# Patient Record
Sex: FEMALE | Race: WHITE | Employment: OTHER | URBAN - METROPOLITAN AREA
[De-identification: names, ages, dates, MRNs, and addresses within clinical notes are randomized per-mention and may not be internally consistent; named-entity substitution may affect disease eponyms.]

---

## 2019-01-01 ENCOUNTER — OFFICE VISIT (OUTPATIENT)
Dept: ORTHOPEDIC SURGERY | Age: 69
End: 2019-01-01
Payer: MEDICARE

## 2019-01-01 ENCOUNTER — APPOINTMENT (OUTPATIENT)
Dept: GENERAL RADIOLOGY | Age: 69
End: 2019-01-01
Payer: MEDICARE

## 2019-01-01 ENCOUNTER — HOSPITAL ENCOUNTER (OUTPATIENT)
Dept: GENERAL RADIOLOGY | Age: 69
Discharge: HOME | End: 2019-10-13
Payer: MEDICARE

## 2019-01-01 ENCOUNTER — HOSPITAL ENCOUNTER (EMERGENCY)
Age: 69
Discharge: HOME | End: 2019-10-02
Payer: MEDICARE

## 2019-01-01 ENCOUNTER — HOSPITAL ENCOUNTER (EMERGENCY)
Age: 69
Discharge: HOME | End: 2019-09-29
Attending: EMERGENCY MEDICINE
Payer: MEDICARE

## 2019-01-01 ENCOUNTER — HOSPITAL ENCOUNTER (OUTPATIENT)
Dept: GENERAL RADIOLOGY | Age: 69
Discharge: HOME | End: 2019-11-27
Payer: MEDICARE

## 2019-01-01 ENCOUNTER — TELEPHONE (OUTPATIENT)
Dept: ORTHOPEDIC SURGERY | Age: 69
End: 2019-01-01

## 2019-01-01 ENCOUNTER — ANESTHESIA (OUTPATIENT)
Dept: OPERATING ROOM | Age: 69
End: 2019-01-01
Payer: MEDICARE

## 2019-01-01 ENCOUNTER — ANESTHESIA EVENT (OUTPATIENT)
Dept: OPERATING ROOM | Age: 69
End: 2019-01-01
Payer: MEDICARE

## 2019-01-01 ENCOUNTER — HOSPITAL ENCOUNTER (OUTPATIENT)
Dept: GENERAL RADIOLOGY | Age: 69
Discharge: HOME | End: 2019-10-30
Payer: MEDICARE

## 2019-01-01 VITALS
HEART RATE: 81 BPM | TEMPERATURE: 97 F | BODY MASS INDEX: 21.17 KG/M2 | DIASTOLIC BLOOD PRESSURE: 72 MMHG | RESPIRATION RATE: 18 BRPM | WEIGHT: 124 LBS | SYSTOLIC BLOOD PRESSURE: 137 MMHG | HEIGHT: 64 IN

## 2019-01-01 VITALS
HEART RATE: 81 BPM | SYSTOLIC BLOOD PRESSURE: 117 MMHG | DIASTOLIC BLOOD PRESSURE: 64 MMHG | WEIGHT: 124 LBS | HEIGHT: 64 IN | BODY MASS INDEX: 21.17 KG/M2

## 2019-01-01 VITALS
OXYGEN SATURATION: 98 % | RESPIRATION RATE: 15 BRPM | TEMPERATURE: 97 F | HEIGHT: 64 IN | BODY MASS INDEX: 21.17 KG/M2 | HEART RATE: 89 BPM | DIASTOLIC BLOOD PRESSURE: 79 MMHG | WEIGHT: 124 LBS | SYSTOLIC BLOOD PRESSURE: 126 MMHG

## 2019-01-01 VITALS — DIASTOLIC BLOOD PRESSURE: 65 MMHG | SYSTOLIC BLOOD PRESSURE: 115 MMHG | TEMPERATURE: 97 F | HEART RATE: 85 BPM

## 2019-01-01 VITALS
RESPIRATION RATE: 14 BRPM | HEART RATE: 90 BPM | WEIGHT: 124 LBS | BODY MASS INDEX: 21.17 KG/M2 | OXYGEN SATURATION: 96 % | HEIGHT: 64 IN | SYSTOLIC BLOOD PRESSURE: 165 MMHG | TEMPERATURE: 98 F | DIASTOLIC BLOOD PRESSURE: 80 MMHG

## 2019-01-01 VITALS — DIASTOLIC BLOOD PRESSURE: 51 MMHG | TEMPERATURE: 95.4 F | OXYGEN SATURATION: 100 % | SYSTOLIC BLOOD PRESSURE: 105 MMHG

## 2019-01-01 DIAGNOSIS — Z46.89 ENCOUNTER FOR CAST CHECK: Primary | ICD-10-CM

## 2019-01-01 DIAGNOSIS — S52.502A CLOSED FRACTURE OF DISTAL ENDS OF LEFT RADIUS AND ULNA, INITIAL ENCOUNTER: ICD-10-CM

## 2019-01-01 DIAGNOSIS — R52 PAIN: ICD-10-CM

## 2019-01-01 DIAGNOSIS — S52.532D CLOSED COLLES' FRACTURE OF LEFT RADIUS WITH ROUTINE HEALING, SUBSEQUENT ENCOUNTER: Primary | ICD-10-CM

## 2019-01-01 DIAGNOSIS — S52.532A CLOSED COLLES' FRACTURE OF LEFT RADIUS, INITIAL ENCOUNTER: Primary | ICD-10-CM

## 2019-01-01 DIAGNOSIS — S52.532D CLOSED COLLES' FRACTURE OF LEFT RADIUS WITH ROUTINE HEALING, SUBSEQUENT ENCOUNTER: ICD-10-CM

## 2019-01-01 DIAGNOSIS — R44.9 SENSORY DEFICIT, LEFT: ICD-10-CM

## 2019-01-01 DIAGNOSIS — S52.602A CLOSED FRACTURE OF DISTAL ENDS OF LEFT RADIUS AND ULNA, INITIAL ENCOUNTER: ICD-10-CM

## 2019-01-01 LAB
ABO/RH: NORMAL
ABO/RH: NORMAL
ANTIBODY SCREEN: NORMAL

## 2019-01-01 PROCEDURE — 73130 X-RAY EXAM OF HAND: CPT

## 2019-01-01 PROCEDURE — 99024 POSTOP FOLLOW-UP VISIT: CPT | Performed by: ORTHOPAEDIC SURGERY

## 2019-01-01 PROCEDURE — 2580000003 HC RX 258: Performed by: NURSE ANESTHETIST, CERTIFIED REGISTERED

## 2019-01-01 PROCEDURE — C1713 ANCHOR/SCREW BN/BN,TIS/BN: HCPCS | Performed by: ORTHOPAEDIC SURGERY

## 2019-01-01 PROCEDURE — 6370000000 HC RX 637 (ALT 250 FOR IP): Performed by: EMERGENCY MEDICINE

## 2019-01-01 PROCEDURE — 3600000014 HC SURGERY LEVEL 4 ADDTL 15MIN: Performed by: ORTHOPAEDIC SURGERY

## 2019-01-01 PROCEDURE — 99213 OFFICE O/P EST LOW 20 MIN: CPT | Performed by: PHYSICIAN ASSISTANT

## 2019-01-01 PROCEDURE — 6370000000 HC RX 637 (ALT 250 FOR IP): Performed by: ORTHOPAEDIC SURGERY

## 2019-01-01 PROCEDURE — 25530 CLTX ULNAR SHFT FX W/O MNPJ: CPT | Performed by: ORTHOPAEDIC SURGERY

## 2019-01-01 PROCEDURE — 73110 X-RAY EXAM OF WRIST: CPT

## 2019-01-01 PROCEDURE — 2500000003 HC RX 250 WO HCPCS: Performed by: NURSE ANESTHETIST, CERTIFIED REGISTERED

## 2019-01-01 PROCEDURE — 99024 POSTOP FOLLOW-UP VISIT: CPT | Performed by: PHYSICIAN ASSISTANT

## 2019-01-01 PROCEDURE — 36415 COLL VENOUS BLD VENIPUNCTURE: CPT

## 2019-01-01 PROCEDURE — 99203 OFFICE O/P NEW LOW 30 MIN: CPT | Performed by: ORTHOPAEDIC SURGERY

## 2019-01-01 PROCEDURE — 7100000010 HC PHASE II RECOVERY - FIRST 15 MIN: Performed by: ORTHOPAEDIC SURGERY

## 2019-01-01 PROCEDURE — 7100000011 HC PHASE II RECOVERY - ADDTL 15 MIN: Performed by: ORTHOPAEDIC SURGERY

## 2019-01-01 PROCEDURE — 99212 OFFICE O/P EST SF 10 MIN: CPT

## 2019-01-01 PROCEDURE — 2720000010 HC SURG SUPPLY STERILE: Performed by: ORTHOPAEDIC SURGERY

## 2019-01-01 PROCEDURE — 2500000003 HC RX 250 WO HCPCS: Performed by: ORTHOPAEDIC SURGERY

## 2019-01-01 PROCEDURE — 73100 X-RAY EXAM OF WRIST: CPT

## 2019-01-01 PROCEDURE — 99284 EMERGENCY DEPT VISIT MOD MDM: CPT

## 2019-01-01 PROCEDURE — 99282 EMERGENCY DEPT VISIT SF MDM: CPT

## 2019-01-01 PROCEDURE — 3700000001 HC ADD 15 MINUTES (ANESTHESIA): Performed by: ORTHOPAEDIC SURGERY

## 2019-01-01 PROCEDURE — 25608 OPTX DST RD XART FX/EPI SEP2: CPT | Performed by: ORTHOPAEDIC SURGERY

## 2019-01-01 PROCEDURE — 3700000000 HC ANESTHESIA ATTENDED CARE: Performed by: ORTHOPAEDIC SURGERY

## 2019-01-01 PROCEDURE — 3600000004 HC SURGERY LEVEL 4 BASE: Performed by: ORTHOPAEDIC SURGERY

## 2019-01-01 PROCEDURE — 2709999900 HC NON-CHARGEABLE SUPPLY: Performed by: ORTHOPAEDIC SURGERY

## 2019-01-01 PROCEDURE — 7100000001 HC PACU RECOVERY - ADDTL 15 MIN: Performed by: ORTHOPAEDIC SURGERY

## 2019-01-01 PROCEDURE — 99285 EMERGENCY DEPT VISIT HI MDM: CPT | Performed by: ORTHOPAEDIC SURGERY

## 2019-01-01 PROCEDURE — 3209999900 FLUORO FOR SURGICAL PROCEDURES

## 2019-01-01 PROCEDURE — 86850 RBC ANTIBODY SCREEN: CPT

## 2019-01-01 PROCEDURE — 6360000002 HC RX W HCPCS: Performed by: NURSE ANESTHETIST, CERTIFIED REGISTERED

## 2019-01-01 PROCEDURE — 86900 BLOOD TYPING SEROLOGIC ABO: CPT

## 2019-01-01 PROCEDURE — 7100000000 HC PACU RECOVERY - FIRST 15 MIN: Performed by: ORTHOPAEDIC SURGERY

## 2019-01-01 PROCEDURE — 2500000003 HC RX 250 WO HCPCS: Performed by: EMERGENCY MEDICINE

## 2019-01-01 PROCEDURE — 94761 N-INVAS EAR/PLS OXIMETRY MLT: CPT

## 2019-01-01 PROCEDURE — 86901 BLOOD TYPING SEROLOGIC RH(D): CPT

## 2019-01-01 PROCEDURE — 64721 CARPAL TUNNEL SURGERY: CPT | Performed by: ORTHOPAEDIC SURGERY

## 2019-01-01 DEVICE — SCREW BNE L18MM DIA2.4MM DST RAD VOLAR S STL ST VAR ANG LOK: Type: IMPLANTABLE DEVICE | Site: RADIUS | Status: FUNCTIONAL

## 2019-01-01 DEVICE — SCREW BNE L14MM DIA2.7MM CORT S STL ST T8 STARDRV RECESS: Type: IMPLANTABLE DEVICE | Site: RADIUS | Status: FUNCTIONAL

## 2019-01-01 DEVICE — SCREW BNE L16MM DIA2.4MM DST RAD VOLAR S STL ST VAR ANG LOK: Type: IMPLANTABLE DEVICE | Site: RADIUS | Status: FUNCTIONAL

## 2019-01-01 DEVICE — SCREW BNE L12MM DIA2.4MM DST RAD VOLAR S STL ST VAR ANG LOK: Type: IMPLANTABLE DEVICE | Site: RADIUS | Status: FUNCTIONAL

## 2019-01-01 DEVICE — PLATE BNE W19.5XL51MM NAR 6X3 H ST L DST RAD VOLAR S STL: Type: IMPLANTABLE DEVICE | Site: RADIUS | Status: FUNCTIONAL

## 2019-01-01 DEVICE — SCREW BNE L12MM DIA2.7MM CORT S STL ST T8 STARDRV RECESS: Type: IMPLANTABLE DEVICE | Site: RADIUS | Status: FUNCTIONAL

## 2019-01-01 RX ORDER — PROMETHAZINE HYDROCHLORIDE 25 MG/ML
6.25 INJECTION, SOLUTION INTRAMUSCULAR; INTRAVENOUS
Status: DISCONTINUED | OUTPATIENT
Start: 2019-01-01 | End: 2019-01-01 | Stop reason: HOSPADM

## 2019-01-01 RX ORDER — ONDANSETRON 2 MG/ML
INJECTION INTRAMUSCULAR; INTRAVENOUS PRN
Status: DISCONTINUED | OUTPATIENT
Start: 2019-01-01 | End: 2019-01-01 | Stop reason: SDUPTHER

## 2019-01-01 RX ORDER — MORPHINE SULFATE 2 MG/ML
2 INJECTION, SOLUTION INTRAMUSCULAR; INTRAVENOUS EVERY 5 MIN PRN
Status: DISCONTINUED | OUTPATIENT
Start: 2019-01-01 | End: 2019-01-01 | Stop reason: HOSPADM

## 2019-01-01 RX ORDER — LIDOCAINE HYDROCHLORIDE 20 MG/ML
INJECTION, SOLUTION INTRAVENOUS PRN
Status: DISCONTINUED | OUTPATIENT
Start: 2019-01-01 | End: 2019-01-01 | Stop reason: SDUPTHER

## 2019-01-01 RX ORDER — CEFAZOLIN SODIUM 2 G/50ML
2 SOLUTION INTRAVENOUS
Status: CANCELLED | OUTPATIENT
Start: 2019-01-01 | End: 2019-01-01

## 2019-01-01 RX ORDER — AZITHROMYCIN 250 MG/1
250 TABLET, FILM COATED ORAL DAILY
COMMUNITY
End: 2019-01-01 | Stop reason: ALTCHOICE

## 2019-01-01 RX ORDER — CEFAZOLIN SODIUM 1 G/3ML
INJECTION, POWDER, FOR SOLUTION INTRAMUSCULAR; INTRAVENOUS PRN
Status: DISCONTINUED | OUTPATIENT
Start: 2019-01-01 | End: 2019-01-01 | Stop reason: SDUPTHER

## 2019-01-01 RX ORDER — HYDROCODONE BITARTRATE AND ACETAMINOPHEN 5; 325 MG/1; MG/1
1 TABLET ORAL EVERY 6 HOURS PRN
Qty: 28 TABLET | Refills: 0 | Status: SHIPPED | OUTPATIENT
Start: 2019-01-01 | End: 2019-01-01

## 2019-01-01 RX ORDER — SODIUM CHLORIDE 9 MG/ML
INJECTION, SOLUTION INTRAVENOUS CONTINUOUS PRN
Status: DISCONTINUED | OUTPATIENT
Start: 2019-01-01 | End: 2019-01-01 | Stop reason: SDUPTHER

## 2019-01-01 RX ORDER — HYDROCODONE BITARTRATE AND ACETAMINOPHEN 5; 325 MG/1; MG/1
1 TABLET ORAL ONCE
Status: COMPLETED | OUTPATIENT
Start: 2019-01-01 | End: 2019-01-01

## 2019-01-01 RX ORDER — DONEPEZIL HYDROCHLORIDE 5 MG/1
5 TABLET, FILM COATED ORAL NIGHTLY
Status: ON HOLD | COMMUNITY
End: 2020-01-01 | Stop reason: HOSPADM

## 2019-01-01 RX ORDER — MEPERIDINE HYDROCHLORIDE 50 MG/ML
12.5 INJECTION INTRAMUSCULAR; INTRAVENOUS; SUBCUTANEOUS EVERY 5 MIN PRN
Status: DISCONTINUED | OUTPATIENT
Start: 2019-01-01 | End: 2019-01-01 | Stop reason: HOSPADM

## 2019-01-01 RX ORDER — LIDOCAINE HYDROCHLORIDE 20 MG/ML
5 INJECTION, SOLUTION INFILTRATION; PERINEURAL ONCE
Status: COMPLETED | OUTPATIENT
Start: 2019-01-01 | End: 2019-01-01

## 2019-01-01 RX ORDER — NEOSTIGMINE METHYLSULFATE 1 MG/ML
INJECTION, SOLUTION INTRAVENOUS PRN
Status: DISCONTINUED | OUTPATIENT
Start: 2019-01-01 | End: 2019-01-01 | Stop reason: SDUPTHER

## 2019-01-01 RX ORDER — HYDROCODONE BITARTRATE AND ACETAMINOPHEN 5; 325 MG/1; MG/1
1 TABLET ORAL ONCE
Status: DISCONTINUED | OUTPATIENT
Start: 2019-01-01 | End: 2019-01-01 | Stop reason: HOSPADM

## 2019-01-01 RX ORDER — GLYCOPYRROLATE 1 MG/5 ML
SYRINGE (ML) INTRAVENOUS PRN
Status: DISCONTINUED | OUTPATIENT
Start: 2019-01-01 | End: 2019-01-01 | Stop reason: SDUPTHER

## 2019-01-01 RX ORDER — ROCURONIUM BROMIDE 10 MG/ML
INJECTION, SOLUTION INTRAVENOUS PRN
Status: DISCONTINUED | OUTPATIENT
Start: 2019-01-01 | End: 2019-01-01 | Stop reason: SDUPTHER

## 2019-01-01 RX ORDER — IBUPROFEN 600 MG/1
600 TABLET ORAL ONCE
Status: COMPLETED | OUTPATIENT
Start: 2019-01-01 | End: 2019-01-01

## 2019-01-01 RX ORDER — BUPIVACAINE HYDROCHLORIDE 5 MG/ML
INJECTION, SOLUTION EPIDURAL; INTRACAUDAL PRN
Status: DISCONTINUED | OUTPATIENT
Start: 2019-01-01 | End: 2019-01-01 | Stop reason: ALTCHOICE

## 2019-01-01 RX ORDER — FENTANYL CITRATE 50 UG/ML
INJECTION, SOLUTION INTRAMUSCULAR; INTRAVENOUS PRN
Status: DISCONTINUED | OUTPATIENT
Start: 2019-01-01 | End: 2019-01-01 | Stop reason: SDUPTHER

## 2019-01-01 RX ORDER — VITAMIN B COMPLEX
TABLET ORAL
COMMUNITY
End: 2020-01-01

## 2019-01-01 RX ORDER — PROPOFOL 10 MG/ML
INJECTION, EMULSION INTRAVENOUS PRN
Status: DISCONTINUED | OUTPATIENT
Start: 2019-01-01 | End: 2019-01-01 | Stop reason: SDUPTHER

## 2019-01-01 RX ORDER — ONDANSETRON 2 MG/ML
4 INJECTION INTRAMUSCULAR; INTRAVENOUS
Status: DISCONTINUED | OUTPATIENT
Start: 2019-01-01 | End: 2019-01-01 | Stop reason: HOSPADM

## 2019-01-01 RX ORDER — DIAPER,BRIEF,INFANT-TODD,DISP
EACH MISCELLANEOUS PRN
Status: DISCONTINUED | OUTPATIENT
Start: 2019-01-01 | End: 2019-01-01 | Stop reason: ALTCHOICE

## 2019-01-01 RX ORDER — EPHEDRINE SULFATE/0.9% NACL/PF 50 MG/5 ML
SYRINGE (ML) INTRAVENOUS PRN
Status: DISCONTINUED | OUTPATIENT
Start: 2019-01-01 | End: 2019-01-01 | Stop reason: SDUPTHER

## 2019-01-01 RX ORDER — MIDAZOLAM HYDROCHLORIDE 1 MG/ML
INJECTION INTRAMUSCULAR; INTRAVENOUS PRN
Status: DISCONTINUED | OUTPATIENT
Start: 2019-01-01 | End: 2019-01-01 | Stop reason: SDUPTHER

## 2019-01-01 RX ADMIN — HYDROCODONE BITARTRATE AND ACETAMINOPHEN 1 TABLET: 5; 325 TABLET ORAL at 03:07

## 2019-01-01 RX ADMIN — CEFAZOLIN 2000 MG: 1 INJECTION, POWDER, FOR SOLUTION INTRAMUSCULAR; INTRAVENOUS at 06:36

## 2019-01-01 RX ADMIN — PROPOFOL 180 MG: 10 INJECTION, EMULSION INTRAVENOUS at 06:25

## 2019-01-01 RX ADMIN — PHENYLEPHRINE HYDROCHLORIDE 50 MCG: 10 INJECTION INTRAVENOUS at 07:13

## 2019-01-01 RX ADMIN — Medication 5 MG: at 07:21

## 2019-01-01 RX ADMIN — ONDANSETRON HYDROCHLORIDE 4 MG: 2 INJECTION, SOLUTION INTRAMUSCULAR; INTRAVENOUS at 07:29

## 2019-01-01 RX ADMIN — PROPOFOL 20 MG: 10 INJECTION, EMULSION INTRAVENOUS at 07:45

## 2019-01-01 RX ADMIN — Medication 0.6 MG: at 07:40

## 2019-01-01 RX ADMIN — PHENYLEPHRINE HYDROCHLORIDE 100 MCG: 10 INJECTION INTRAVENOUS at 06:38

## 2019-01-01 RX ADMIN — PHENYLEPHRINE HYDROCHLORIDE 100 MCG: 10 INJECTION INTRAVENOUS at 06:31

## 2019-01-01 RX ADMIN — FENTANYL CITRATE 100 MCG: 50 INJECTION, SOLUTION INTRAMUSCULAR; INTRAVENOUS at 06:25

## 2019-01-01 RX ADMIN — SODIUM CHLORIDE: 9 INJECTION, SOLUTION INTRAVENOUS at 06:19

## 2019-01-01 RX ADMIN — Medication 3 MG: at 07:40

## 2019-01-01 RX ADMIN — FENTANYL CITRATE 50 MCG: 50 INJECTION, SOLUTION INTRAMUSCULAR; INTRAVENOUS at 07:45

## 2019-01-01 RX ADMIN — ROCURONIUM BROMIDE 35 MG: 10 INJECTION, SOLUTION INTRAVENOUS at 06:25

## 2019-01-01 RX ADMIN — PHENYLEPHRINE HYDROCHLORIDE 100 MCG: 10 INJECTION INTRAVENOUS at 06:43

## 2019-01-01 RX ADMIN — LIDOCAINE HYDROCHLORIDE 5 ML: 20 INJECTION, SOLUTION INFILTRATION; PERINEURAL at 02:52

## 2019-01-01 RX ADMIN — PHENYLEPHRINE HYDROCHLORIDE 100 MCG: 10 INJECTION INTRAVENOUS at 06:26

## 2019-01-01 RX ADMIN — PHENYLEPHRINE HYDROCHLORIDE 50 MCG: 10 INJECTION INTRAVENOUS at 07:10

## 2019-01-01 RX ADMIN — SODIUM CHLORIDE: 9 INJECTION, SOLUTION INTRAVENOUS at 07:22

## 2019-01-01 RX ADMIN — MIDAZOLAM HYDROCHLORIDE 2 MG: 1 INJECTION, SOLUTION INTRAMUSCULAR; INTRAVENOUS at 06:19

## 2019-01-01 RX ADMIN — Medication 5 MG: at 07:35

## 2019-01-01 RX ADMIN — IBUPROFEN 600 MG: 600 TABLET, FILM COATED ORAL at 02:52

## 2019-01-01 RX ADMIN — LIDOCAINE HYDROCHLORIDE 40 MG: 20 INJECTION, SOLUTION INTRAVENOUS at 06:25

## 2019-01-01 RX ADMIN — PHENYLEPHRINE HYDROCHLORIDE 100 MCG: 10 INJECTION INTRAVENOUS at 06:25

## 2019-01-01 ASSESSMENT — PULMONARY FUNCTION TESTS
PIF_VALUE: 17
PIF_VALUE: 17
PIF_VALUE: 18
PIF_VALUE: 18
PIF_VALUE: 15
PIF_VALUE: 15
PIF_VALUE: 14
PIF_VALUE: 20
PIF_VALUE: 14
PIF_VALUE: 6
PIF_VALUE: 18
PIF_VALUE: 18
PIF_VALUE: 14
PIF_VALUE: 19
PIF_VALUE: 19
PIF_VALUE: 18
PIF_VALUE: 17
PIF_VALUE: 18
PIF_VALUE: 19
PIF_VALUE: 14
PIF_VALUE: 17
PIF_VALUE: 18
PIF_VALUE: 17
PIF_VALUE: 18
PIF_VALUE: 19
PIF_VALUE: 18
PIF_VALUE: 14
PIF_VALUE: 19
PIF_VALUE: 18
PIF_VALUE: 18
PIF_VALUE: 21
PIF_VALUE: 18
PIF_VALUE: 20
PIF_VALUE: 15
PIF_VALUE: 16
PIF_VALUE: 18
PIF_VALUE: 19
PIF_VALUE: 17
PIF_VALUE: 1
PIF_VALUE: 18
PIF_VALUE: 18
PIF_VALUE: 16
PIF_VALUE: 18
PIF_VALUE: 18
PIF_VALUE: 14
PIF_VALUE: 17
PIF_VALUE: 23
PIF_VALUE: 17
PIF_VALUE: 18
PIF_VALUE: 1
PIF_VALUE: 18
PIF_VALUE: 25
PIF_VALUE: 2
PIF_VALUE: 19
PIF_VALUE: 17
PIF_VALUE: 18
PIF_VALUE: 19
PIF_VALUE: 19
PIF_VALUE: 20
PIF_VALUE: 17
PIF_VALUE: 17
PIF_VALUE: 18
PIF_VALUE: 19
PIF_VALUE: 19
PIF_VALUE: 18
PIF_VALUE: 22
PIF_VALUE: 17
PIF_VALUE: 17
PIF_VALUE: 19
PIF_VALUE: 19
PIF_VALUE: 18
PIF_VALUE: 17
PIF_VALUE: 17
PIF_VALUE: 14
PIF_VALUE: 19
PIF_VALUE: 18
PIF_VALUE: 17
PIF_VALUE: 18
PIF_VALUE: 17
PIF_VALUE: 19
PIF_VALUE: 19
PIF_VALUE: 17
PIF_VALUE: 19
PIF_VALUE: 2
PIF_VALUE: 17
PIF_VALUE: 18

## 2019-01-01 ASSESSMENT — PAIN DESCRIPTION - LOCATION
LOCATION: ARM
LOCATION: WRIST

## 2019-01-01 ASSESSMENT — PAIN DESCRIPTION - ORIENTATION
ORIENTATION: LEFT
ORIENTATION: LEFT

## 2019-01-01 ASSESSMENT — PAIN SCALES - GENERAL
PAINLEVEL_OUTOF10: 10
PAINLEVEL_OUTOF10: 4
PAINLEVEL_OUTOF10: 0

## 2019-01-01 ASSESSMENT — LIFESTYLE VARIABLES: SMOKING_STATUS: 0

## 2019-01-01 ASSESSMENT — PAIN DESCRIPTION - FREQUENCY: FREQUENCY: CONTINUOUS

## 2019-01-01 ASSESSMENT — PAIN DESCRIPTION - PAIN TYPE
TYPE: ACUTE PAIN
TYPE: ACUTE PAIN

## 2019-01-01 ASSESSMENT — PAIN DESCRIPTION - DESCRIPTORS
DESCRIPTORS: ACHING
DESCRIPTORS: NUMBNESS

## 2019-01-01 ASSESSMENT — PAIN DESCRIPTION - PROGRESSION: CLINICAL_PROGRESSION: GRADUALLY WORSENING

## 2020-01-01 ENCOUNTER — HOSPITAL ENCOUNTER (EMERGENCY)
Age: 70
Discharge: HOME | End: 2020-03-26
Attending: EMERGENCY MEDICINE
Payer: MEDICARE

## 2020-01-01 ENCOUNTER — APPOINTMENT (OUTPATIENT)
Dept: CT IMAGING | Age: 70
DRG: 698 | End: 2020-01-01
Payer: MEDICARE

## 2020-01-01 ENCOUNTER — ANESTHESIA (OUTPATIENT)
Dept: ENDOSCOPY | Age: 70
DRG: 811 | End: 2020-01-01
Payer: MEDICARE

## 2020-01-01 ENCOUNTER — ANESTHESIA (OUTPATIENT)
Dept: OPERATING ROOM | Age: 70
DRG: 379 | End: 2020-01-01
Payer: MEDICARE

## 2020-01-01 ENCOUNTER — HOSPITAL ENCOUNTER (OUTPATIENT)
Age: 70
Discharge: HOME | End: 2020-05-26

## 2020-01-01 ENCOUNTER — APPOINTMENT (OUTPATIENT)
Dept: ULTRASOUND IMAGING | Age: 70
DRG: 698 | End: 2020-01-01
Payer: MEDICARE

## 2020-01-01 ENCOUNTER — HOSPITAL ENCOUNTER (INPATIENT)
Age: 70
LOS: 4 days | Discharge: NURSING, REHAB OR LONG-TERM CARE | DRG: 811 | End: 2020-04-18
Attending: EMERGENCY MEDICINE | Admitting: INTERNAL MEDICINE
Payer: MEDICARE

## 2020-01-01 ENCOUNTER — HOSPITAL ENCOUNTER (OUTPATIENT)
Age: 70
Discharge: HOME | End: 2020-05-21

## 2020-01-01 ENCOUNTER — HOSPITAL ENCOUNTER (INPATIENT)
Age: 70
LOS: 5 days | Discharge: NURSING, REHAB OR LONG-TERM CARE | DRG: 698 | End: 2020-05-04
Attending: EMERGENCY MEDICINE | Admitting: FAMILY MEDICINE
Payer: MEDICARE

## 2020-01-01 ENCOUNTER — APPOINTMENT (OUTPATIENT)
Dept: GENERAL RADIOLOGY | Age: 70
DRG: 811 | End: 2020-01-01
Payer: MEDICARE

## 2020-01-01 ENCOUNTER — ANESTHESIA EVENT (OUTPATIENT)
Dept: ENDOSCOPY | Age: 70
DRG: 379 | End: 2020-01-01
Payer: MEDICARE

## 2020-01-01 ENCOUNTER — HOSPITAL ENCOUNTER (OUTPATIENT)
Age: 70
Discharge: HOME | End: 2020-02-05
Payer: MEDICARE

## 2020-01-01 ENCOUNTER — TELEPHONE (OUTPATIENT)
Dept: SURGERY | Age: 70
End: 2020-01-01

## 2020-01-01 ENCOUNTER — APPOINTMENT (OUTPATIENT)
Dept: GENERAL RADIOLOGY | Age: 70
DRG: 698 | End: 2020-01-01
Payer: MEDICARE

## 2020-01-01 ENCOUNTER — TELEPHONE (OUTPATIENT)
Dept: ORTHOPEDIC SURGERY | Age: 70
End: 2020-01-01

## 2020-01-01 ENCOUNTER — OFFICE VISIT (OUTPATIENT)
Dept: ORTHOPEDIC SURGERY | Age: 70
End: 2020-01-01
Payer: MEDICARE

## 2020-01-01 ENCOUNTER — APPOINTMENT (OUTPATIENT)
Dept: CT IMAGING | Age: 70
DRG: 379 | End: 2020-01-01
Payer: MEDICARE

## 2020-01-01 ENCOUNTER — HOSPITAL ENCOUNTER (OUTPATIENT)
Age: 70
Discharge: HOME | End: 2020-02-09

## 2020-01-01 ENCOUNTER — HOSPITAL ENCOUNTER (OUTPATIENT)
Dept: GENERAL RADIOLOGY | Age: 70
Discharge: HOME | End: 2020-01-18
Payer: MEDICARE

## 2020-01-01 ENCOUNTER — APPOINTMENT (OUTPATIENT)
Dept: NUCLEAR MEDICINE | Age: 70
DRG: 811 | End: 2020-01-01
Payer: MEDICARE

## 2020-01-01 ENCOUNTER — ANESTHESIA EVENT (OUTPATIENT)
Dept: ENDOSCOPY | Age: 70
DRG: 811 | End: 2020-01-01
Payer: MEDICARE

## 2020-01-01 ENCOUNTER — ANESTHESIA (OUTPATIENT)
Dept: ENDOSCOPY | Age: 70
DRG: 379 | End: 2020-01-01
Payer: MEDICARE

## 2020-01-01 ENCOUNTER — APPOINTMENT (OUTPATIENT)
Dept: GENERAL RADIOLOGY | Age: 70
DRG: 379 | End: 2020-01-01
Payer: MEDICARE

## 2020-01-01 ENCOUNTER — HOSPITAL ENCOUNTER (INPATIENT)
Age: 70
LOS: 6 days | Discharge: HOME | DRG: 379 | End: 2020-01-23
Attending: EMERGENCY MEDICINE | Admitting: INTERNAL MEDICINE
Payer: MEDICARE

## 2020-01-01 ENCOUNTER — HOSPITAL ENCOUNTER (EMERGENCY)
Age: 70
Discharge: HOME | End: 2020-04-08
Attending: EMERGENCY MEDICINE
Payer: MEDICARE

## 2020-01-01 ENCOUNTER — HOSPITAL ENCOUNTER (OUTPATIENT)
Age: 70
Discharge: HOME | End: 2020-03-07
Payer: MEDICARE

## 2020-01-01 ENCOUNTER — APPOINTMENT (OUTPATIENT)
Dept: CT IMAGING | Age: 70
DRG: 811 | End: 2020-01-01
Payer: MEDICARE

## 2020-01-01 ENCOUNTER — OFFICE VISIT (OUTPATIENT)
Dept: SURGERY | Age: 70
End: 2020-01-01
Payer: MEDICARE

## 2020-01-01 ENCOUNTER — ANESTHESIA EVENT (OUTPATIENT)
Dept: OPERATING ROOM | Age: 70
DRG: 379 | End: 2020-01-01
Payer: MEDICARE

## 2020-01-01 ENCOUNTER — HOSPITAL ENCOUNTER (OUTPATIENT)
Age: 70
Discharge: HOME | End: 2020-06-07

## 2020-01-01 ENCOUNTER — HOSPITAL ENCOUNTER (OUTPATIENT)
Dept: INFUSION THERAPY | Age: 70
Discharge: HOME | End: 2020-03-06
Payer: MEDICARE

## 2020-01-01 VITALS
SYSTOLIC BLOOD PRESSURE: 123 MMHG | RESPIRATION RATE: 42 BRPM | OXYGEN SATURATION: 93 % | DIASTOLIC BLOOD PRESSURE: 58 MMHG

## 2020-01-01 VITALS
HEART RATE: 84 BPM | DIASTOLIC BLOOD PRESSURE: 60 MMHG | WEIGHT: 124 LBS | RESPIRATION RATE: 18 BRPM | BODY MASS INDEX: 21.17 KG/M2 | SYSTOLIC BLOOD PRESSURE: 111 MMHG | HEIGHT: 64 IN

## 2020-01-01 VITALS
WEIGHT: 125.2 LBS | TEMPERATURE: 98 F | BODY MASS INDEX: 21.37 KG/M2 | HEART RATE: 91 BPM | HEIGHT: 64 IN | OXYGEN SATURATION: 97 % | RESPIRATION RATE: 16 BRPM | SYSTOLIC BLOOD PRESSURE: 114 MMHG | DIASTOLIC BLOOD PRESSURE: 57 MMHG

## 2020-01-01 VITALS
HEART RATE: 112 BPM | TEMPERATURE: 99 F | HEIGHT: 64 IN | DIASTOLIC BLOOD PRESSURE: 58 MMHG | BODY MASS INDEX: 20.64 KG/M2 | WEIGHT: 120.9 LBS | OXYGEN SATURATION: 91 % | RESPIRATION RATE: 20 BRPM | SYSTOLIC BLOOD PRESSURE: 117 MMHG

## 2020-01-01 VITALS
TEMPERATURE: 98.2 F | HEART RATE: 109 BPM | OXYGEN SATURATION: 100 % | BODY MASS INDEX: 19.29 KG/M2 | HEIGHT: 64 IN | WEIGHT: 113 LBS | RESPIRATION RATE: 20 BRPM | DIASTOLIC BLOOD PRESSURE: 68 MMHG | SYSTOLIC BLOOD PRESSURE: 128 MMHG

## 2020-01-01 VITALS
RESPIRATION RATE: 20 BRPM | SYSTOLIC BLOOD PRESSURE: 140 MMHG | OXYGEN SATURATION: 99 % | HEIGHT: 60 IN | HEART RATE: 99 BPM | WEIGHT: 105 LBS | TEMPERATURE: 98.3 F | DIASTOLIC BLOOD PRESSURE: 74 MMHG | BODY MASS INDEX: 20.62 KG/M2

## 2020-01-01 VITALS
OXYGEN SATURATION: 99 % | HEIGHT: 64 IN | HEART RATE: 98 BPM | RESPIRATION RATE: 18 BRPM | BODY MASS INDEX: 17.93 KG/M2 | TEMPERATURE: 98.1 F | WEIGHT: 105 LBS | DIASTOLIC BLOOD PRESSURE: 64 MMHG | SYSTOLIC BLOOD PRESSURE: 119 MMHG

## 2020-01-01 VITALS
DIASTOLIC BLOOD PRESSURE: 68 MMHG | RESPIRATION RATE: 19 BRPM | SYSTOLIC BLOOD PRESSURE: 141 MMHG | OXYGEN SATURATION: 96 %

## 2020-01-01 VITALS
DIASTOLIC BLOOD PRESSURE: 54 MMHG | HEART RATE: 96 BPM | SYSTOLIC BLOOD PRESSURE: 113 MMHG | TEMPERATURE: 99.1 F | RESPIRATION RATE: 18 BRPM

## 2020-01-01 VITALS
HEART RATE: 80 BPM | BODY MASS INDEX: 18.78 KG/M2 | RESPIRATION RATE: 20 BRPM | OXYGEN SATURATION: 100 % | HEIGHT: 64 IN | TEMPERATURE: 97.2 F | SYSTOLIC BLOOD PRESSURE: 130 MMHG | DIASTOLIC BLOOD PRESSURE: 68 MMHG | WEIGHT: 110 LBS

## 2020-01-01 VITALS — DIASTOLIC BLOOD PRESSURE: 65 MMHG | OXYGEN SATURATION: 99 % | SYSTOLIC BLOOD PRESSURE: 121 MMHG

## 2020-01-01 DIAGNOSIS — D64.9 ANEMIA, UNSPECIFIED TYPE: Primary | ICD-10-CM

## 2020-01-01 LAB
ABO/RH: NORMAL
ACINETOBACTER BAUMANNII BY PCR: NOT DETECTED
ALBUMIN SERPL-MCNC: 2.2 G/DL (ref 3.5–5.2)
ALBUMIN SERPL-MCNC: 2.3 G/DL (ref 3.5–5.2)
ALBUMIN SERPL-MCNC: 2.4 G/DL (ref 3.5–5.2)
ALBUMIN SERPL-MCNC: 2.4 G/DL (ref 3.5–5.2)
ALBUMIN SERPL-MCNC: 2.6 G/DL (ref 3.5–5.2)
ALBUMIN SERPL-MCNC: 2.7 G/DL (ref 3.5–5.2)
ALBUMIN SERPL-MCNC: 3.2 G/DL (ref 3.5–5.2)
ALBUMIN SERPL-MCNC: 3.8 G/DL (ref 3.5–5.2)
ALBUMIN SERPL-MCNC: 4 G/DL (ref 3.5–5.2)
ALP BLD-CCNC: 113 U/L (ref 35–104)
ALP BLD-CCNC: 115 U/L (ref 35–104)
ALP BLD-CCNC: 116 U/L (ref 35–104)
ALP BLD-CCNC: 150 U/L (ref 35–104)
ALP BLD-CCNC: 203 U/L (ref 35–104)
ALP BLD-CCNC: 220 U/L (ref 35–104)
ALP BLD-CCNC: 252 U/L (ref 35–104)
ALP BLD-CCNC: 62 U/L (ref 35–104)
ALP BLD-CCNC: 97 U/L (ref 35–104)
ALT SERPL-CCNC: 10 U/L (ref 0–32)
ALT SERPL-CCNC: 13 U/L (ref 0–32)
ALT SERPL-CCNC: 16 U/L (ref 0–32)
ALT SERPL-CCNC: 19 U/L (ref 0–32)
ALT SERPL-CCNC: 21 U/L (ref 0–32)
ALT SERPL-CCNC: 44 U/L (ref 0–32)
ALT SERPL-CCNC: 52 U/L (ref 0–32)
AMMONIA: <10 UMOL/L (ref 11–51)
ANION GAP SERPL CALCULATED.3IONS-SCNC: 10 MMOL/L (ref 7–16)
ANION GAP SERPL CALCULATED.3IONS-SCNC: 11 MMOL/L (ref 7–16)
ANION GAP SERPL CALCULATED.3IONS-SCNC: 12 MMOL/L (ref 7–16)
ANION GAP SERPL CALCULATED.3IONS-SCNC: 13 MMOL/L (ref 7–16)
ANION GAP SERPL CALCULATED.3IONS-SCNC: 14 MMOL/L (ref 7–16)
ANION GAP SERPL CALCULATED.3IONS-SCNC: 14 MMOL/L (ref 7–16)
ANION GAP SERPL CALCULATED.3IONS-SCNC: 18 MMOL/L (ref 7–16)
ANION GAP SERPL CALCULATED.3IONS-SCNC: 18 MMOL/L (ref 7–16)
ANION GAP SERPL CALCULATED.3IONS-SCNC: 9 MMOL/L (ref 7–16)
ANION GAP SERPL CALCULATED.3IONS-SCNC: 9 MMOL/L (ref 7–16)
ANISOCYTOSIS: ABNORMAL
ANTIBODY SCREEN: NORMAL
APTT: 25.2 SEC (ref 24.5–35.1)
AST SERPL-CCNC: 12 U/L (ref 0–31)
AST SERPL-CCNC: 12 U/L (ref 0–31)
AST SERPL-CCNC: 14 U/L (ref 0–31)
AST SERPL-CCNC: 18 U/L (ref 0–31)
AST SERPL-CCNC: 21 U/L (ref 0–31)
AST SERPL-CCNC: 23 U/L (ref 0–31)
AST SERPL-CCNC: 26 U/L (ref 0–31)
AST SERPL-CCNC: 31 U/L (ref 0–31)
AST SERPL-CCNC: 7 U/L (ref 0–31)
BACTERIA: ABNORMAL /HPF
BASOPHILIC STIPPLING: ABNORMAL
BASOPHILS ABSOLUTE: 0 E9/L (ref 0–0.2)
BASOPHILS ABSOLUTE: 0.01 E9/L (ref 0–0.2)
BASOPHILS ABSOLUTE: 0.01 E9/L (ref 0–0.2)
BASOPHILS RELATIVE PERCENT: 0 % (ref 0–2)
BASOPHILS RELATIVE PERCENT: 0.1 % (ref 0–2)
BASOPHILS RELATIVE PERCENT: 0.2 % (ref 0–2)
BASOPHILS RELATIVE PERCENT: 0.3 % (ref 0–2)
BASOPHILS RELATIVE PERCENT: 0.3 % (ref 0–2)
BASOPHILS RELATIVE PERCENT: 0.4 % (ref 0–2)
BASOPHILS RELATIVE PERCENT: 0.8 % (ref 0–2)
BILIRUB SERPL-MCNC: 0.7 MG/DL (ref 0–1.2)
BILIRUB SERPL-MCNC: 0.8 MG/DL (ref 0–1.2)
BILIRUB SERPL-MCNC: 0.9 MG/DL (ref 0–1.2)
BILIRUB SERPL-MCNC: 1 MG/DL (ref 0–1.2)
BILIRUB SERPL-MCNC: 1.1 MG/DL (ref 0–1.2)
BILIRUB SERPL-MCNC: 1.8 MG/DL (ref 0–1.2)
BILIRUB SERPL-MCNC: 1.8 MG/DL (ref 0–1.2)
BILIRUBIN URINE: ABNORMAL
BILIRUBIN URINE: NEGATIVE
BLOOD BANK DISPENSE STATUS: NORMAL
BLOOD BANK PRODUCT CODE: NORMAL
BLOOD CULTURE, ROUTINE: ABNORMAL
BLOOD CULTURE, ROUTINE: NORMAL
BLOOD, URINE: ABNORMAL
BOTTLE TYPE: NORMAL
BPU ID: NORMAL
BUN BLDV-MCNC: 10 MG/DL (ref 8–23)
BUN BLDV-MCNC: 11 MG/DL (ref 8–23)
BUN BLDV-MCNC: 12 MG/DL (ref 8–23)
BUN BLDV-MCNC: 12 MG/DL (ref 8–23)
BUN BLDV-MCNC: 19 MG/DL (ref 8–23)
BUN BLDV-MCNC: 19 MG/DL (ref 8–23)
BUN BLDV-MCNC: 22 MG/DL (ref 8–23)
BUN BLDV-MCNC: 26 MG/DL (ref 8–23)
BUN BLDV-MCNC: 35 MG/DL (ref 8–23)
BUN BLDV-MCNC: 40 MG/DL (ref 8–23)
BUN BLDV-MCNC: 43 MG/DL (ref 8–23)
BUN BLDV-MCNC: 44 MG/DL (ref 8–23)
BUN BLDV-MCNC: 63 MG/DL (ref 8–23)
BUN BLDV-MCNC: 64 MG/DL (ref 8–23)
BUN BLDV-MCNC: 76 MG/DL (ref 8–23)
BUN BLDV-MCNC: 9 MG/DL (ref 8–23)
BUN BLDV-MCNC: 9 MG/DL (ref 8–23)
BURR CELLS: ABNORMAL
CALCIUM IONIZED: 1.18 MMOL/L (ref 1.15–1.33)
CALCIUM SERPL-MCNC: 7.4 MG/DL (ref 8.6–10.2)
CALCIUM SERPL-MCNC: 7.6 MG/DL (ref 8.6–10.2)
CALCIUM SERPL-MCNC: 7.7 MG/DL (ref 8.6–10.2)
CALCIUM SERPL-MCNC: 7.7 MG/DL (ref 8.6–10.2)
CALCIUM SERPL-MCNC: 7.8 MG/DL (ref 8.6–10.2)
CALCIUM SERPL-MCNC: 7.8 MG/DL (ref 8.6–10.2)
CALCIUM SERPL-MCNC: 7.9 MG/DL (ref 8.6–10.2)
CALCIUM SERPL-MCNC: 8 MG/DL (ref 8.6–10.2)
CALCIUM SERPL-MCNC: 8.2 MG/DL (ref 8.6–10.2)
CALCIUM SERPL-MCNC: 8.3 MG/DL (ref 8.6–10.2)
CALCIUM SERPL-MCNC: 8.4 MG/DL (ref 8.6–10.2)
CALCIUM SERPL-MCNC: 8.4 MG/DL (ref 8.6–10.2)
CALCIUM SERPL-MCNC: 8.6 MG/DL (ref 8.6–10.2)
CALCIUM SERPL-MCNC: 8.8 MG/DL (ref 8.6–10.2)
CALCIUM SERPL-MCNC: 8.9 MG/DL (ref 8.6–10.2)
CALCIUM SERPL-MCNC: 9 MG/DL (ref 8.6–10.2)
CALCIUM SERPL-MCNC: 9 MG/DL (ref 8.6–10.2)
CANDIDA ALBICANS BY PCR: NOT DETECTED
CANDIDA GLABRATA BY PCR: NOT DETECTED
CANDIDA KRUSEI BY PCR: NOT DETECTED
CANDIDA PARAPSILOSIS BY PCR: NOT DETECTED
CANDIDA TROPICALIS BY PCR: NOT DETECTED
CHLORIDE BLD-SCNC: 100 MMOL/L (ref 98–107)
CHLORIDE BLD-SCNC: 100 MMOL/L (ref 98–107)
CHLORIDE BLD-SCNC: 101 MMOL/L (ref 98–107)
CHLORIDE BLD-SCNC: 102 MMOL/L (ref 98–107)
CHLORIDE BLD-SCNC: 104 MMOL/L (ref 98–107)
CHLORIDE BLD-SCNC: 106 MMOL/L (ref 98–107)
CHLORIDE BLD-SCNC: 108 MMOL/L (ref 98–107)
CHLORIDE BLD-SCNC: 109 MMOL/L (ref 98–107)
CHLORIDE BLD-SCNC: 111 MMOL/L (ref 98–107)
CHLORIDE BLD-SCNC: 113 MMOL/L (ref 98–107)
CHLORIDE BLD-SCNC: 115 MMOL/L (ref 98–107)
CHLORIDE BLD-SCNC: 119 MMOL/L (ref 98–107)
CHLORIDE BLD-SCNC: 93 MMOL/L (ref 98–107)
CHLORIDE BLD-SCNC: 95 MMOL/L (ref 98–107)
CHLORIDE BLD-SCNC: 98 MMOL/L (ref 98–107)
CHP ED QC CHECK: YES
CHP ED QC CHECK: YES
CLARITY: ABNORMAL
CLARITY: CLEAR
CO2: 16 MMOL/L (ref 22–29)
CO2: 18 MMOL/L (ref 22–29)
CO2: 20 MMOL/L (ref 22–29)
CO2: 22 MMOL/L (ref 22–29)
CO2: 23 MMOL/L (ref 22–29)
CO2: 24 MMOL/L (ref 22–29)
CO2: 24 MMOL/L (ref 22–29)
CO2: 25 MMOL/L (ref 22–29)
CO2: 26 MMOL/L (ref 22–29)
CO2: 27 MMOL/L (ref 22–29)
COLOR: ABNORMAL
COLOR: YELLOW
CREAT SERPL-MCNC: 0.5 MG/DL (ref 0.5–1)
CREAT SERPL-MCNC: 0.6 MG/DL (ref 0.5–1)
CREAT SERPL-MCNC: 0.7 MG/DL (ref 0.5–1)
CREAT SERPL-MCNC: 0.8 MG/DL (ref 0.5–1)
CREAT SERPL-MCNC: 1 MG/DL (ref 0.5–1)
CREAT SERPL-MCNC: 1.1 MG/DL (ref 0.5–1)
CREAT SERPL-MCNC: 1.3 MG/DL (ref 0.5–1)
CREAT SERPL-MCNC: 1.3 MG/DL (ref 0.5–1)
CREAT SERPL-MCNC: 2.1 MG/DL (ref 0.5–1)
CREAT SERPL-MCNC: 2.1 MG/DL (ref 0.5–1)
CREAT SERPL-MCNC: 3.1 MG/DL (ref 0.5–1)
CREATININE URINE: 48 MG/DL (ref 29–226)
CULTURE, BLOOD 2: NORMAL
CULTURE, BLOOD 2: NORMAL
DESCRIPTION BLOOD BANK: NORMAL
DOHLE BODIES: ABNORMAL
EKG ATRIAL RATE: 101 BPM
EKG ATRIAL RATE: 114 BPM
EKG ATRIAL RATE: 115 BPM
EKG ATRIAL RATE: 116 BPM
EKG ATRIAL RATE: 142 BPM
EKG ATRIAL RATE: 96 BPM
EKG P AXIS: 64 DEGREES
EKG P AXIS: 66 DEGREES
EKG P AXIS: 77 DEGREES
EKG P AXIS: 77 DEGREES
EKG P AXIS: 78 DEGREES
EKG P-R INTERVAL: 118 MS
EKG P-R INTERVAL: 120 MS
EKG P-R INTERVAL: 120 MS
EKG P-R INTERVAL: 122 MS
EKG P-R INTERVAL: 130 MS
EKG P-R INTERVAL: 134 MS
EKG Q-T INTERVAL: 344 MS
EKG Q-T INTERVAL: 348 MS
EKG Q-T INTERVAL: 350 MS
EKG Q-T INTERVAL: 356 MS
EKG Q-T INTERVAL: 362 MS
EKG Q-T INTERVAL: 364 MS
EKG QRS DURATION: 68 MS
EKG QRS DURATION: 70 MS
EKG QRS DURATION: 72 MS
EKG QRS DURATION: 76 MS
EKG QRS DURATION: 80 MS
EKG QRS DURATION: 80 MS
EKG QTC CALCULATION (BAZETT): 451 MS
EKG QTC CALCULATION (BAZETT): 459 MS
EKG QTC CALCULATION (BAZETT): 474 MS
EKG QTC CALCULATION (BAZETT): 494 MS
EKG QTC CALCULATION (BAZETT): 500 MS
EKG QTC CALCULATION (BAZETT): 538 MS
EKG R AXIS: 27 DEGREES
EKG R AXIS: 32 DEGREES
EKG R AXIS: 37 DEGREES
EKG R AXIS: 41 DEGREES
EKG R AXIS: 59 DEGREES
EKG R AXIS: 75 DEGREES
EKG T AXIS: 23 DEGREES
EKG T AXIS: 37 DEGREES
EKG T AXIS: 51 DEGREES
EKG T AXIS: 58 DEGREES
EKG T AXIS: 61 DEGREES
EKG T AXIS: 62 DEGREES
EKG VENTRICULAR RATE: 101 BPM
EKG VENTRICULAR RATE: 114 BPM
EKG VENTRICULAR RATE: 115 BPM
EKG VENTRICULAR RATE: 116 BPM
EKG VENTRICULAR RATE: 142 BPM
EKG VENTRICULAR RATE: 96 BPM
ENTEROBACTER CLOACAE COMPLEX BY PCR: NOT DETECTED
ENTEROBACTERALES BY PCR: NOT DETECTED
ENTEROCOCCUS BY PCR: NOT DETECTED
EOSINOPHIL, URINE: 0 % (ref 0–1)
EOSINOPHILS ABSOLUTE: 0 E9/L (ref 0.05–0.5)
EOSINOPHILS ABSOLUTE: 0.05 E9/L (ref 0.05–0.5)
EOSINOPHILS ABSOLUTE: 0.06 E9/L (ref 0.05–0.5)
EOSINOPHILS ABSOLUTE: 0.08 E9/L (ref 0.05–0.5)
EOSINOPHILS ABSOLUTE: 0.13 E9/L (ref 0.05–0.5)
EOSINOPHILS ABSOLUTE: 0.17 E9/L (ref 0.05–0.5)
EOSINOPHILS ABSOLUTE: 0.2 E9/L (ref 0.05–0.5)
EOSINOPHILS ABSOLUTE: 0.3 E9/L (ref 0.05–0.5)
EOSINOPHILS RELATIVE PERCENT: 0 % (ref 0–6)
EOSINOPHILS RELATIVE PERCENT: 0 % (ref 0–6)
EOSINOPHILS RELATIVE PERCENT: 0.1 % (ref 0–6)
EOSINOPHILS RELATIVE PERCENT: 0.7 % (ref 0–6)
EOSINOPHILS RELATIVE PERCENT: 0.9 % (ref 0–6)
EOSINOPHILS RELATIVE PERCENT: 1 % (ref 0–6)
EOSINOPHILS RELATIVE PERCENT: 1.1 % (ref 0–6)
EOSINOPHILS RELATIVE PERCENT: 1.5 % (ref 0–6)
EOSINOPHILS RELATIVE PERCENT: 3 % (ref 0–6)
EOSINOPHILS RELATIVE PERCENT: 5.3 % (ref 0–6)
EOSINOPHILS RELATIVE PERCENT: 7.8 % (ref 0–6)
EPITHELIAL CELLS, UA: ABNORMAL /HPF
ESCHERICHIA COLI BY PCR: NOT DETECTED
FERRITIN: 3434 NG/ML
FERRITIN: 623 NG/ML
FOLATE: 14.4 NG/ML (ref 4.8–24.2)
FOLATE: 8.3 NG/ML (ref 4.8–24.2)
GFR AFRICAN AMERICAN: 18
GFR AFRICAN AMERICAN: 28
GFR AFRICAN AMERICAN: 28
GFR AFRICAN AMERICAN: 49
GFR AFRICAN AMERICAN: 49
GFR AFRICAN AMERICAN: 60
GFR AFRICAN AMERICAN: >60
GFR NON-AFRICAN AMERICAN: 15 ML/MIN/1.73
GFR NON-AFRICAN AMERICAN: 23 ML/MIN/1.73
GFR NON-AFRICAN AMERICAN: 23 ML/MIN/1.73
GFR NON-AFRICAN AMERICAN: 41 ML/MIN/1.73
GFR NON-AFRICAN AMERICAN: 41 ML/MIN/1.73
GFR NON-AFRICAN AMERICAN: 49 ML/MIN/1.73
GFR NON-AFRICAN AMERICAN: 55 ML/MIN/1.73
GFR NON-AFRICAN AMERICAN: >60 ML/MIN/1.73
GLUCOSE BLD-MCNC: 106 MG/DL (ref 74–99)
GLUCOSE BLD-MCNC: 115 MG/DL (ref 74–99)
GLUCOSE BLD-MCNC: 120 MG/DL (ref 74–99)
GLUCOSE BLD-MCNC: 128 MG/DL (ref 74–99)
GLUCOSE BLD-MCNC: 129 MG/DL (ref 74–99)
GLUCOSE BLD-MCNC: 139 MG/DL (ref 74–99)
GLUCOSE BLD-MCNC: 150 MG/DL (ref 74–99)
GLUCOSE BLD-MCNC: 151 MG/DL (ref 74–99)
GLUCOSE BLD-MCNC: 156 MG/DL (ref 74–99)
GLUCOSE BLD-MCNC: 161 MG/DL
GLUCOSE BLD-MCNC: 192 MG/DL (ref 74–99)
GLUCOSE BLD-MCNC: 194 MG/DL (ref 74–99)
GLUCOSE BLD-MCNC: 222 MG/DL (ref 74–99)
GLUCOSE BLD-MCNC: 275 MG/DL (ref 74–99)
GLUCOSE BLD-MCNC: 287 MG/DL
GLUCOSE BLD-MCNC: 93 MG/DL (ref 74–99)
GLUCOSE BLD-MCNC: 95 MG/DL (ref 74–99)
GLUCOSE BLD-MCNC: 95 MG/DL (ref 74–99)
GLUCOSE BLD-MCNC: 97 MG/DL (ref 74–99)
GLUCOSE URINE: NEGATIVE MG/DL
HAEMOPHILUS INFLUENZAE BY PCR: NOT DETECTED
HAPTOGLOBIN: 76 MG/DL (ref 30–200)
HCT VFR BLD CALC: 17.4 % (ref 34–48)
HCT VFR BLD CALC: 17.5 % (ref 34–48)
HCT VFR BLD CALC: 19.3 % (ref 34–48)
HCT VFR BLD CALC: 19.4 % (ref 34–48)
HCT VFR BLD CALC: 20.1 % (ref 34–48)
HCT VFR BLD CALC: 20.9 % (ref 34–48)
HCT VFR BLD CALC: 21.9 % (ref 34–48)
HCT VFR BLD CALC: 22.3 % (ref 34–48)
HCT VFR BLD CALC: 22.5 % (ref 34–48)
HCT VFR BLD CALC: 23 % (ref 34–48)
HCT VFR BLD CALC: 23.6 % (ref 34–48)
HCT VFR BLD CALC: 23.8 % (ref 34–48)
HCT VFR BLD CALC: 23.8 % (ref 34–48)
HCT VFR BLD CALC: 24.1 % (ref 34–48)
HCT VFR BLD CALC: 24.3 % (ref 34–48)
HCT VFR BLD CALC: 24.5 % (ref 34–48)
HCT VFR BLD CALC: 24.6 % (ref 34–48)
HCT VFR BLD CALC: 24.7 % (ref 34–48)
HCT VFR BLD CALC: 24.8 % (ref 34–48)
HCT VFR BLD CALC: 25.1 % (ref 34–48)
HCT VFR BLD CALC: 25.1 % (ref 34–48)
HCT VFR BLD CALC: 25.4 % (ref 34–48)
HCT VFR BLD CALC: 25.5 % (ref 34–48)
HCT VFR BLD CALC: 25.6 % (ref 34–48)
HCT VFR BLD CALC: 26.1 % (ref 34–48)
HCT VFR BLD CALC: 26.2 % (ref 34–48)
HCT VFR BLD CALC: 26.2 % (ref 34–48)
HCT VFR BLD CALC: 27.7 % (ref 34–48)
HCT VFR BLD CALC: 31.4 % (ref 34–48)
HCT VFR BLD CALC: 31.7 % (ref 34–48)
HEMOGLOBIN: 10.3 G/DL (ref 11.5–15.5)
HEMOGLOBIN: 10.4 G/DL (ref 11.5–15.5)
HEMOGLOBIN: 5.5 G/DL (ref 11.5–15.5)
HEMOGLOBIN: 5.9 G/DL (ref 11.5–15.5)
HEMOGLOBIN: 6.1 G/DL (ref 11.5–15.5)
HEMOGLOBIN: 6.2 G/DL (ref 11.5–15.5)
HEMOGLOBIN: 6.3 G/DL (ref 11.5–15.5)
HEMOGLOBIN: 6.7 G/DL (ref 11.5–15.5)
HEMOGLOBIN: 6.8 G/DL (ref 11.5–15.5)
HEMOGLOBIN: 7.1 G/DL (ref 11.5–15.5)
HEMOGLOBIN: 7.3 G/DL (ref 11.5–15.5)
HEMOGLOBIN: 7.3 G/DL (ref 11.5–15.5)
HEMOGLOBIN: 7.4 G/DL (ref 11.5–15.5)
HEMOGLOBIN: 7.4 G/DL (ref 11.5–15.5)
HEMOGLOBIN: 7.5 G/DL (ref 11.5–15.5)
HEMOGLOBIN: 7.6 G/DL (ref 11.5–15.5)
HEMOGLOBIN: 7.6 G/DL (ref 11.5–15.5)
HEMOGLOBIN: 7.7 G/DL (ref 11.5–15.5)
HEMOGLOBIN: 7.8 G/DL (ref 11.5–15.5)
HEMOGLOBIN: 7.9 G/DL (ref 11.5–15.5)
HEMOGLOBIN: 7.9 G/DL (ref 11.5–15.5)
HEMOGLOBIN: 8 G/DL (ref 11.5–15.5)
HEMOGLOBIN: 8.1 G/DL (ref 11.5–15.5)
HEMOGLOBIN: 8.2 G/DL (ref 11.5–15.5)
HEMOGLOBIN: 8.3 G/DL (ref 11.5–15.5)
HEMOGLOBIN: 8.5 G/DL (ref 11.5–15.5)
HEMOGLOBIN: 8.8 G/DL (ref 11.5–15.5)
HEMOGLOBIN: 9.1 G/DL (ref 11.5–15.5)
HYPOCHROMIA: ABNORMAL
IMMATURE GRANULOCYTES #: 0.02 E9/L
IMMATURE GRANULOCYTES #: 0.34 E9/L
IMMATURE GRANULOCYTES %: 0.5 % (ref 0–5)
IMMATURE GRANULOCYTES %: 4.7 % (ref 0–5)
IMMATURE RETIC FRACT: 1 % (ref 3–15.9)
INR BLD: 1
INR BLD: 1.2
IRON SATURATION: 18 % (ref 15–50)
IRON SATURATION: 52 % (ref 15–50)
IRON: 136 MCG/DL (ref 37–145)
IRON: 27 MCG/DL (ref 37–145)
KETONES, URINE: NEGATIVE MG/DL
KLEBSIELLA OXYTOCA BY PCR: NOT DETECTED
KLEBSIELLA PNEUMONIAE GROUP BY PCR: NOT DETECTED
LACTATE DEHYDROGENASE: 239 U/L (ref 135–214)
LACTIC ACID, SEPSIS: 1.5 MMOL/L (ref 0.5–1.9)
LACTIC ACID, SEPSIS: 2.8 MMOL/L (ref 0.5–1.9)
LACTIC ACID, SEPSIS: 2.9 MMOL/L (ref 0.5–1.9)
LACTIC ACID, SEPSIS: 3.3 MMOL/L (ref 0.5–1.9)
LACTIC ACID: 1.1 MMOL/L (ref 0.5–2.2)
LACTIC ACID: 2.4 MMOL/L (ref 0.5–2.2)
LACTIC ACID: 5 MMOL/L (ref 0.5–2.2)
LEUKOCYTE ESTERASE, URINE: ABNORMAL
LIPASE: 24 U/L (ref 13–60)
LIPASE: 36 U/L (ref 13–60)
LISTERIA MONOCYTOGENES BY PCR: NOT DETECTED
LV EF: 60 %
LVEF MODALITY: NORMAL
LYMPHOCYTES ABSOLUTE: 0.87 E9/L (ref 1.5–4)
LYMPHOCYTES ABSOLUTE: 0.91 E9/L (ref 1.5–4)
LYMPHOCYTES ABSOLUTE: 1.16 E9/L (ref 1.5–4)
LYMPHOCYTES ABSOLUTE: 1.22 E9/L (ref 1.5–4)
LYMPHOCYTES ABSOLUTE: 1.33 E9/L (ref 1.5–4)
LYMPHOCYTES ABSOLUTE: 1.39 E9/L (ref 1.5–4)
LYMPHOCYTES ABSOLUTE: 1.62 E9/L (ref 1.5–4)
LYMPHOCYTES ABSOLUTE: 1.85 E9/L (ref 1.5–4)
LYMPHOCYTES ABSOLUTE: 1.91 E9/L (ref 1.5–4)
LYMPHOCYTES ABSOLUTE: 2.22 E9/L (ref 1.5–4)
LYMPHOCYTES ABSOLUTE: 2.67 E9/L (ref 1.5–4)
LYMPHOCYTES RELATIVE PERCENT: 10.4 % (ref 20–42)
LYMPHOCYTES RELATIVE PERCENT: 15 % (ref 20–42)
LYMPHOCYTES RELATIVE PERCENT: 17.7 % (ref 20–42)
LYMPHOCYTES RELATIVE PERCENT: 20 % (ref 20–42)
LYMPHOCYTES RELATIVE PERCENT: 23.5 % (ref 20–42)
LYMPHOCYTES RELATIVE PERCENT: 26.7 % (ref 20–42)
LYMPHOCYTES RELATIVE PERCENT: 27 % (ref 20–42)
LYMPHOCYTES RELATIVE PERCENT: 29.2 % (ref 20–42)
LYMPHOCYTES RELATIVE PERCENT: 31.6 % (ref 20–42)
LYMPHOCYTES RELATIVE PERCENT: 45 % (ref 20–42)
LYMPHOCYTES RELATIVE PERCENT: 9 % (ref 20–42)
MAGNESIUM: 1.5 MG/DL (ref 1.6–2.6)
MAGNESIUM: 1.6 MG/DL (ref 1.6–2.6)
MAGNESIUM: 2.2 MG/DL (ref 1.6–2.6)
MAGNESIUM: 2.3 MG/DL (ref 1.6–2.6)
MCH RBC QN AUTO: 28.7 PG (ref 26–35)
MCH RBC QN AUTO: 28.9 PG (ref 26–35)
MCH RBC QN AUTO: 29 PG (ref 26–35)
MCH RBC QN AUTO: 29.1 PG (ref 26–35)
MCH RBC QN AUTO: 29.3 PG (ref 26–35)
MCH RBC QN AUTO: 29.4 PG (ref 26–35)
MCH RBC QN AUTO: 29.6 PG (ref 26–35)
MCH RBC QN AUTO: 29.7 PG (ref 26–35)
MCH RBC QN AUTO: 29.7 PG (ref 26–35)
MCH RBC QN AUTO: 31 PG (ref 26–35)
MCH RBC QN AUTO: 31.4 PG (ref 26–35)
MCH RBC QN AUTO: 32.4 PG (ref 26–35)
MCH RBC QN AUTO: 32.7 PG (ref 26–35)
MCH RBC QN AUTO: 35.8 PG (ref 26–35)
MCHC RBC AUTO-ENTMCNC: 31.3 % (ref 32–34.5)
MCHC RBC AUTO-ENTMCNC: 31.6 % (ref 32–34.5)
MCHC RBC AUTO-ENTMCNC: 31.6 % (ref 32–34.5)
MCHC RBC AUTO-ENTMCNC: 31.7 % (ref 32–34.5)
MCHC RBC AUTO-ENTMCNC: 31.8 % (ref 32–34.5)
MCHC RBC AUTO-ENTMCNC: 31.9 % (ref 32–34.5)
MCHC RBC AUTO-ENTMCNC: 32 % (ref 32–34.5)
MCHC RBC AUTO-ENTMCNC: 32.3 % (ref 32–34.5)
MCHC RBC AUTO-ENTMCNC: 32.5 % (ref 32–34.5)
MCHC RBC AUTO-ENTMCNC: 32.9 % (ref 32–34.5)
MCHC RBC AUTO-ENTMCNC: 32.9 % (ref 32–34.5)
MCHC RBC AUTO-ENTMCNC: 33.1 % (ref 32–34.5)
MCHC RBC AUTO-ENTMCNC: 33.7 % (ref 32–34.5)
MCHC RBC AUTO-ENTMCNC: 35.7 % (ref 32–34.5)
MCV RBC AUTO: 100.4 FL (ref 80–99.9)
MCV RBC AUTO: 103.6 FL (ref 80–99.9)
MCV RBC AUTO: 87.7 FL (ref 80–99.9)
MCV RBC AUTO: 90 FL (ref 80–99.9)
MCV RBC AUTO: 90.4 FL (ref 80–99.9)
MCV RBC AUTO: 90.9 FL (ref 80–99.9)
MCV RBC AUTO: 91.1 FL (ref 80–99.9)
MCV RBC AUTO: 91.4 FL (ref 80–99.9)
MCV RBC AUTO: 92.9 FL (ref 80–99.9)
MCV RBC AUTO: 93.5 FL (ref 80–99.9)
MCV RBC AUTO: 93.9 FL (ref 80–99.9)
MCV RBC AUTO: 94.9 FL (ref 80–99.9)
MCV RBC AUTO: 95.5 FL (ref 80–99.9)
MCV RBC AUTO: 96.2 FL (ref 80–99.9)
MCV RBC AUTO: 97 FL (ref 80–99.9)
MCV RBC AUTO: 98 FL (ref 80–99.9)
METAMYELOCYTES RELATIVE PERCENT: 0.9 % (ref 0–1)
METAMYELOCYTES RELATIVE PERCENT: 1 % (ref 0–1)
METAMYELOCYTES RELATIVE PERCENT: 1.7 % (ref 0–1)
METAMYELOCYTES RELATIVE PERCENT: 3.5 % (ref 0–1)
METER GLUCOSE: 102 MG/DL (ref 74–99)
METER GLUCOSE: 121 MG/DL (ref 74–99)
METER GLUCOSE: 125 MG/DL (ref 74–99)
METER GLUCOSE: 141 MG/DL (ref 74–99)
METER GLUCOSE: 153 MG/DL (ref 74–99)
METER GLUCOSE: 156 MG/DL (ref 74–99)
METER GLUCOSE: 166 MG/DL (ref 74–99)
METER GLUCOSE: 174 MG/DL (ref 74–99)
METER GLUCOSE: 178 MG/DL (ref 74–99)
METER GLUCOSE: 192 MG/DL (ref 74–99)
METER GLUCOSE: 287 MG/DL (ref 74–99)
METER GLUCOSE: 84 MG/DL (ref 74–99)
METER GLUCOSE: 88 MG/DL (ref 74–99)
METER GLUCOSE: 91 MG/DL (ref 74–99)
METER GLUCOSE: 97 MG/DL (ref 74–99)
MONOCYTES ABSOLUTE: 0.11 E9/L (ref 0.1–0.95)
MONOCYTES ABSOLUTE: 0.15 E9/L (ref 0.1–0.95)
MONOCYTES ABSOLUTE: 0.34 E9/L (ref 0.1–0.95)
MONOCYTES ABSOLUTE: 0.39 E9/L (ref 0.1–0.95)
MONOCYTES ABSOLUTE: 0.44 E9/L (ref 0.1–0.95)
MONOCYTES ABSOLUTE: 0.44 E9/L (ref 0.1–0.95)
MONOCYTES ABSOLUTE: 0.56 E9/L (ref 0.1–0.95)
MONOCYTES ABSOLUTE: 0.64 E9/L (ref 0.1–0.95)
MONOCYTES ABSOLUTE: 0.64 E9/L (ref 0.1–0.95)
MONOCYTES ABSOLUTE: 0.65 E9/L (ref 0.1–0.95)
MONOCYTES ABSOLUTE: 1.29 E9/L (ref 0.1–0.95)
MONOCYTES RELATIVE PERCENT: 11 % (ref 2–12)
MONOCYTES RELATIVE PERCENT: 13 % (ref 2–12)
MONOCYTES RELATIVE PERCENT: 15.8 % (ref 2–12)
MONOCYTES RELATIVE PERCENT: 2.6 % (ref 2–12)
MONOCYTES RELATIVE PERCENT: 3 % (ref 2–12)
MONOCYTES RELATIVE PERCENT: 4.3 % (ref 2–12)
MONOCYTES RELATIVE PERCENT: 4.3 % (ref 2–12)
MONOCYTES RELATIVE PERCENT: 4.4 % (ref 2–12)
MONOCYTES RELATIVE PERCENT: 6.2 % (ref 2–12)
MONOCYTES RELATIVE PERCENT: 6.2 % (ref 2–12)
MONOCYTES RELATIVE PERCENT: 8.9 % (ref 2–12)
MYELOCYTE PERCENT: 0.9 % (ref 0–0)
MYELOCYTE PERCENT: 0.9 % (ref 0–0)
MYELOCYTE PERCENT: 1.7 % (ref 0–0)
MYELOCYTE PERCENT: 1.8 % (ref 0–0)
MYELOCYTE PERCENT: 4 % (ref 0–0)
NEISSERIA MENINGITIDIS BY PCR: NOT DETECTED
NEUTROPHILS ABSOLUTE: 1.52 E9/L (ref 1.8–7.3)
NEUTROPHILS ABSOLUTE: 11.22 E9/L (ref 1.8–7.3)
NEUTROPHILS ABSOLUTE: 11.82 E9/L (ref 1.8–7.3)
NEUTROPHILS ABSOLUTE: 2.24 E9/L (ref 1.8–7.3)
NEUTROPHILS ABSOLUTE: 2.51 E9/L (ref 1.8–7.3)
NEUTROPHILS ABSOLUTE: 3.58 E9/L (ref 1.8–7.3)
NEUTROPHILS ABSOLUTE: 4.12 E9/L (ref 1.8–7.3)
NEUTROPHILS ABSOLUTE: 4.18 E9/L (ref 1.8–7.3)
NEUTROPHILS ABSOLUTE: 5.62 E9/L (ref 1.8–7.3)
NEUTROPHILS ABSOLUTE: 5.94 E9/L (ref 1.8–7.3)
NEUTROPHILS ABSOLUTE: 8.44 E9/L (ref 1.8–7.3)
NEUTROPHILS RELATIVE PERCENT: 37 % (ref 43–80)
NEUTROPHILS RELATIVE PERCENT: 57 % (ref 43–80)
NEUTROPHILS RELATIVE PERCENT: 58.5 % (ref 43–80)
NEUTROPHILS RELATIVE PERCENT: 60 % (ref 43–80)
NEUTROPHILS RELATIVE PERCENT: 63.5 % (ref 43–80)
NEUTROPHILS RELATIVE PERCENT: 63.7 % (ref 43–80)
NEUTROPHILS RELATIVE PERCENT: 71 % (ref 43–80)
NEUTROPHILS RELATIVE PERCENT: 73.9 % (ref 43–80)
NEUTROPHILS RELATIVE PERCENT: 76.1 % (ref 43–80)
NEUTROPHILS RELATIVE PERCENT: 80 % (ref 43–80)
NEUTROPHILS RELATIVE PERCENT: 82 % (ref 43–80)
NITRITE, URINE: NEGATIVE
NITRITE, URINE: POSITIVE
NUCLEATED RED BLOOD CELLS: 0.9 /100 WBC
NUCLEATED RED BLOOD CELLS: 2 /100 WBC
NUCLEATED RED BLOOD CELLS: 3.5 /100 WBC
NUCLEATED RED BLOOD CELLS: 3.5 /100 WBC
NUCLEATED RED BLOOD CELLS: 6 /100 WBC
ORDER NUMBER: NORMAL
ORGANISM: ABNORMAL
OVALOCYTES: ABNORMAL
PATHOLOGIST REVIEW: NORMAL
PDW BLD-RTO: 14.6 FL (ref 11.5–15)
PDW BLD-RTO: 15.4 FL (ref 11.5–15)
PDW BLD-RTO: 15.7 FL (ref 11.5–15)
PDW BLD-RTO: 16.2 FL (ref 11.5–15)
PDW BLD-RTO: 16.5 FL (ref 11.5–15)
PDW BLD-RTO: 16.8 FL (ref 11.5–15)
PDW BLD-RTO: 17.1 FL (ref 11.5–15)
PDW BLD-RTO: 17.2 FL (ref 11.5–15)
PDW BLD-RTO: 17.5 FL (ref 11.5–15)
PDW BLD-RTO: 17.5 FL (ref 11.5–15)
PDW BLD-RTO: 17.6 FL (ref 11.5–15)
PDW BLD-RTO: 17.8 FL (ref 11.5–15)
PDW BLD-RTO: 18.7 FL (ref 11.5–15)
PDW BLD-RTO: 19.5 FL (ref 11.5–15)
PDW BLD-RTO: 19.9 FL (ref 11.5–15)
PDW BLD-RTO: 23.4 FL (ref 11.5–15)
PH UA: 5.5 (ref 5–9)
PLATELET # BLD: 105 E9/L (ref 130–450)
PLATELET # BLD: 149 E9/L (ref 130–450)
PLATELET # BLD: 168 E9/L (ref 130–450)
PLATELET # BLD: 206 E9/L (ref 130–450)
PLATELET # BLD: 219 E9/L (ref 130–450)
PLATELET # BLD: 230 E9/L (ref 130–450)
PLATELET # BLD: 234 E9/L (ref 130–450)
PLATELET # BLD: 245 E9/L (ref 130–450)
PLATELET # BLD: 305 E9/L (ref 130–450)
PLATELET # BLD: 317 E9/L (ref 130–450)
PLATELET # BLD: 366 E9/L (ref 130–450)
PLATELET # BLD: 38 E9/L (ref 130–450)
PLATELET # BLD: 40 E9/L (ref 130–450)
PLATELET # BLD: 45 E9/L (ref 130–450)
PLATELET # BLD: 52 E9/L (ref 130–450)
PLATELET # BLD: 72 E9/L (ref 130–450)
PLATELET CONFIRMATION: NORMAL
PMV BLD AUTO: 13.2 FL (ref 7–12)
PMV BLD AUTO: 13.2 FL (ref 7–12)
PMV BLD AUTO: 13.3 FL (ref 7–12)
PMV BLD AUTO: 13.4 FL (ref 7–12)
PMV BLD AUTO: 13.4 FL (ref 7–12)
PMV BLD AUTO: 13.6 FL (ref 7–12)
PMV BLD AUTO: 13.7 FL (ref 7–12)
PMV BLD AUTO: 14.1 FL (ref 7–12)
PMV BLD AUTO: 14.1 FL (ref 7–12)
PMV BLD AUTO: 14.4 FL (ref 7–12)
PMV BLD AUTO: ABNORMAL FL (ref 7–12)
POIKILOCYTES: ABNORMAL
POLYCHROMASIA: ABNORMAL
POTASSIUM REFLEX MAGNESIUM: 3 MMOL/L (ref 3.5–5)
POTASSIUM REFLEX MAGNESIUM: 3.3 MMOL/L (ref 3.5–5)
POTASSIUM REFLEX MAGNESIUM: 3.5 MMOL/L (ref 3.5–5)
POTASSIUM REFLEX MAGNESIUM: 3.7 MMOL/L (ref 3.5–5)
POTASSIUM REFLEX MAGNESIUM: 4.5 MMOL/L (ref 3.5–5)
POTASSIUM REFLEX MAGNESIUM: 4.6 MMOL/L (ref 3.5–5)
POTASSIUM REFLEX MAGNESIUM: 4.9 MMOL/L (ref 3.5–5)
POTASSIUM REFLEX MAGNESIUM: 6.5 MMOL/L (ref 3.5–5)
POTASSIUM SERPL-SCNC: 3.1 MMOL/L (ref 3.5–5)
POTASSIUM SERPL-SCNC: 3.2 MMOL/L (ref 3.5–5)
POTASSIUM SERPL-SCNC: 3.3 MMOL/L (ref 3.5–5)
POTASSIUM SERPL-SCNC: 3.5 MMOL/L (ref 3.5–5)
POTASSIUM SERPL-SCNC: 3.6 MMOL/L (ref 3.5–5)
POTASSIUM SERPL-SCNC: 3.7 MMOL/L (ref 3.5–5)
POTASSIUM SERPL-SCNC: 3.8 MMOL/L (ref 3.5–5)
POTASSIUM SERPL-SCNC: 4 MMOL/L (ref 3.5–5)
POTASSIUM SERPL-SCNC: 5 MMOL/L (ref 3.5–5)
PRO-BNP: 7673 PG/ML (ref 0–125)
PROCALCITONIN: >100 NG/ML (ref 0–0.08)
PROTEIN UA: 100 MG/DL
PROTEIN UA: ABNORMAL MG/DL
PROTEIN UA: ABNORMAL MG/DL
PROTEIN UA: NEGATIVE MG/DL
PROTEUS BY PCR: NOT DETECTED
PROTHROMBIN TIME: 11.6 SEC (ref 9.3–12.4)
PROTHROMBIN TIME: 13.5 SEC (ref 9.3–12.4)
PSEUDOMONAS AERUGINOSA BY PCR: NOT DETECTED
RBC # BLD: 1.68 E12/L (ref 3.5–5.5)
RBC # BLD: 1.82 E12/L (ref 3.5–5.5)
RBC # BLD: 1.97 E12/L (ref 3.5–5.5)
RBC # BLD: 2 E12/L (ref 3.5–5.5)
RBC # BLD: 2.14 E12/L (ref 3.5–5.5)
RBC # BLD: 2.4 E12/L (ref 3.5–5.5)
RBC # BLD: 2.46 E12/L (ref 3.5–5.5)
RBC # BLD: 2.49 E12/L (ref 3.5–5.5)
RBC # BLD: 2.54 E12/L (ref 3.5–5.5)
RBC # BLD: 2.59 E12/L (ref 3.5–5.5)
RBC # BLD: 2.68 E12/L (ref 3.5–5.5)
RBC # BLD: 2.76 E12/L (ref 3.5–5.5)
RBC # BLD: 2.77 E12/L (ref 3.5–5.5)
RBC # BLD: 2.79 E12/L (ref 3.5–5.5)
RBC # BLD: 3.31 E12/L (ref 3.5–5.5)
RBC # BLD: 3.32 E12/L (ref 3.5–5.5)
RBC UA: ABNORMAL /HPF (ref 0–2)
RETIC HGB EQUIVALENT: 36.4 PG (ref 28.2–36.6)
RETICULOCYTE ABSOLUTE COUNT: 0.14 E12/L
RETICULOCYTE COUNT PCT: 4 % (ref 0.4–1.9)
ROULEAUX: ABNORMAL
SARS-COV-2, NAAT: NOT DETECTED
SARS-COV-2, NAAT: NOT DETECTED
SARS-COV-2: NOT DETECTED
SCHISTOCYTES: ABNORMAL
SERRATIA MARCESCENS BY PCR: NOT DETECTED
SODIUM BLD-SCNC: 130 MMOL/L (ref 132–146)
SODIUM BLD-SCNC: 134 MMOL/L (ref 132–146)
SODIUM BLD-SCNC: 135 MMOL/L (ref 132–146)
SODIUM BLD-SCNC: 135 MMOL/L (ref 132–146)
SODIUM BLD-SCNC: 136 MMOL/L (ref 132–146)
SODIUM BLD-SCNC: 136 MMOL/L (ref 132–146)
SODIUM BLD-SCNC: 137 MMOL/L (ref 132–146)
SODIUM BLD-SCNC: 140 MMOL/L (ref 132–146)
SODIUM BLD-SCNC: 142 MMOL/L (ref 132–146)
SODIUM BLD-SCNC: 142 MMOL/L (ref 132–146)
SODIUM BLD-SCNC: 144 MMOL/L (ref 132–146)
SODIUM BLD-SCNC: 144 MMOL/L (ref 132–146)
SODIUM BLD-SCNC: 147 MMOL/L (ref 132–146)
SODIUM BLD-SCNC: 149 MMOL/L (ref 132–146)
SODIUM BLD-SCNC: 150 MMOL/L (ref 132–146)
SODIUM URINE: 43 MMOL/L
SOURCE OF BLOOD CULTURE: NORMAL
SOURCE: NORMAL
SPECIFIC GRAVITY UA: 1.01 (ref 1–1.03)
SPECIFIC GRAVITY UA: 1.01 (ref 1–1.03)
SPECIFIC GRAVITY UA: <=1.005 (ref 1–1.03)
SPECIFIC GRAVITY UA: >=1.03 (ref 1–1.03)
STAPHYLOCOCCUS AUREUS BY PCR: NOT DETECTED
STAPHYLOCOCCUS SPECIES BY PCR: NOT DETECTED
STREPTOCOCCUS AGALACTIAE BY PCR: NOT DETECTED
STREPTOCOCCUS PNEUMONIAE BY PCR: NOT DETECTED
STREPTOCOCCUS PYOGENES  BY PCR: NOT DETECTED
STREPTOCOCCUS SPECIES BY PCR: NOT DETECTED
T4 FREE: 1.3 NG/DL (ref 0.93–1.7)
TARGET CELLS: ABNORMAL
TEAR DROP CELLS: ABNORMAL
TEAR DROP CELLS: ABNORMAL
TOTAL CK: 10 U/L (ref 20–180)
TOTAL IRON BINDING CAPACITY: 151 MCG/DL (ref 250–450)
TOTAL IRON BINDING CAPACITY: 261 MCG/DL (ref 250–450)
TOTAL PROTEIN: 5.2 G/DL (ref 6.4–8.3)
TOTAL PROTEIN: 5.6 G/DL (ref 6.4–8.3)
TOTAL PROTEIN: 5.8 G/DL (ref 6.4–8.3)
TOTAL PROTEIN: 6.5 G/DL (ref 6.4–8.3)
TOTAL PROTEIN: 6.6 G/DL (ref 6.4–8.3)
TOTAL PROTEIN: 6.9 G/DL (ref 6.4–8.3)
TOTAL PROTEIN: 6.9 G/DL (ref 6.4–8.3)
TOTAL PROTEIN: 7 G/DL (ref 6.4–8.3)
TOTAL PROTEIN: 7.3 G/DL (ref 6.4–8.3)
TROPONIN: <0.01 NG/ML (ref 0–0.03)
TSH SERPL DL<=0.05 MIU/L-ACNC: 1.08 UIU/ML (ref 0.27–4.2)
URINE CULTURE, ROUTINE: ABNORMAL
UROBILINOGEN, URINE: 0.2 E.U./DL
UROBILINOGEN, URINE: 1 E.U./DL
VITAMIN B-12: 249 PG/ML (ref 211–946)
VITAMIN B-12: 648 PG/ML (ref 211–946)
WBC # BLD: 11.1 E9/L (ref 4.5–11.5)
WBC # BLD: 12.9 E9/L (ref 4.5–11.5)
WBC # BLD: 13.9 E9/L (ref 4.5–11.5)
WBC # BLD: 3.8 E9/L (ref 4.5–11.5)
WBC # BLD: 3.8 E9/L (ref 4.5–11.5)
WBC # BLD: 3.9 E9/L (ref 4.5–11.5)
WBC # BLD: 4.1 E9/L (ref 4.5–11.5)
WBC # BLD: 5.4 E9/L (ref 4.5–11.5)
WBC # BLD: 5.6 E9/L (ref 4.5–11.5)
WBC # BLD: 5.8 E9/L (ref 4.5–11.5)
WBC # BLD: 7.2 E9/L (ref 4.5–11.5)
WBC # BLD: 7.4 E9/L (ref 4.5–11.5)
WBC # BLD: 7.8 E9/L (ref 4.5–11.5)
WBC # BLD: 8.4 E9/L (ref 4.5–11.5)
WBC # BLD: 9.3 E9/L (ref 4.5–11.5)
WBC # BLD: 9.9 E9/L (ref 4.5–11.5)
WBC UA: >20 /HPF (ref 0–5)
WBC UA: ABNORMAL /HPF (ref 0–5)
YEAST: PRESENT /HPF

## 2020-01-01 PROCEDURE — 0DJ08ZZ INSPECTION OF UPPER INTESTINAL TRACT, VIA NATURAL OR ARTIFICIAL OPENING ENDOSCOPIC: ICD-10-PCS | Performed by: INTERNAL MEDICINE

## 2020-01-01 PROCEDURE — 6360000002 HC RX W HCPCS: Performed by: NURSE PRACTITIONER

## 2020-01-01 PROCEDURE — 86920 COMPATIBILITY TEST SPIN: CPT

## 2020-01-01 PROCEDURE — 84439 ASSAY OF FREE THYROXINE: CPT

## 2020-01-01 PROCEDURE — 85018 HEMOGLOBIN: CPT

## 2020-01-01 PROCEDURE — 99212 OFFICE O/P EST SF 10 MIN: CPT | Performed by: PHYSICIAN ASSISTANT

## 2020-01-01 PROCEDURE — 6360000002 HC RX W HCPCS: Performed by: INTERNAL MEDICINE

## 2020-01-01 PROCEDURE — 85610 PROTHROMBIN TIME: CPT

## 2020-01-01 PROCEDURE — 83605 ASSAY OF LACTIC ACID: CPT

## 2020-01-01 PROCEDURE — 80053 COMPREHEN METABOLIC PANEL: CPT

## 2020-01-01 PROCEDURE — C9113 INJ PANTOPRAZOLE SODIUM, VIA: HCPCS | Performed by: INTERNAL MEDICINE

## 2020-01-01 PROCEDURE — 36415 COLL VENOUS BLD VENIPUNCTURE: CPT | Performed by: SURGERY

## 2020-01-01 PROCEDURE — 2580000003 HC RX 258: Performed by: INTERNAL MEDICINE

## 2020-01-01 PROCEDURE — 86850 RBC ANTIBODY SCREEN: CPT

## 2020-01-01 PROCEDURE — 36415 COLL VENOUS BLD VENIPUNCTURE: CPT

## 2020-01-01 PROCEDURE — 2500000003 HC RX 250 WO HCPCS: Performed by: NURSE ANESTHETIST, CERTIFIED REGISTERED

## 2020-01-01 PROCEDURE — 1036F TOBACCO NON-USER: CPT | Performed by: SURGERY

## 2020-01-01 PROCEDURE — 2060000000 HC ICU INTERMEDIATE R&B

## 2020-01-01 PROCEDURE — 6370000000 HC RX 637 (ALT 250 FOR IP): Performed by: INTERNAL MEDICINE

## 2020-01-01 PROCEDURE — P9016 RBC LEUKOCYTES REDUCED: HCPCS

## 2020-01-01 PROCEDURE — 2580000003 HC RX 258: Performed by: STUDENT IN AN ORGANIZED HEALTH CARE EDUCATION/TRAINING PROGRAM

## 2020-01-01 PROCEDURE — 99232 SBSQ HOSP IP/OBS MODERATE 35: CPT | Performed by: SURGERY

## 2020-01-01 PROCEDURE — G8427 DOCREV CUR MEDS BY ELIG CLIN: HCPCS | Performed by: SURGERY

## 2020-01-01 PROCEDURE — 6360000004 HC RX CONTRAST MEDICATION: Performed by: RADIOLOGY

## 2020-01-01 PROCEDURE — 84484 ASSAY OF TROPONIN QUANT: CPT

## 2020-01-01 PROCEDURE — 36430 TRANSFUSION BLD/BLD COMPNT: CPT

## 2020-01-01 PROCEDURE — 81001 URINALYSIS AUTO W/SCOPE: CPT

## 2020-01-01 PROCEDURE — 3600007501: Performed by: SURGERY

## 2020-01-01 PROCEDURE — A4641 RADIOPHARM DX AGENT NOC: HCPCS | Performed by: RADIOLOGY

## 2020-01-01 PROCEDURE — 99223 1ST HOSP IP/OBS HIGH 75: CPT | Performed by: INTERNAL MEDICINE

## 2020-01-01 PROCEDURE — 94760 N-INVAS EAR/PLS OXIMETRY 1: CPT

## 2020-01-01 PROCEDURE — 84443 ASSAY THYROID STIM HORMONE: CPT

## 2020-01-01 PROCEDURE — 3700000001 HC ADD 15 MINUTES (ANESTHESIA): Performed by: INTERNAL MEDICINE

## 2020-01-01 PROCEDURE — 83735 ASSAY OF MAGNESIUM: CPT

## 2020-01-01 PROCEDURE — 93005 ELECTROCARDIOGRAM TRACING: CPT | Performed by: EMERGENCY MEDICINE

## 2020-01-01 PROCEDURE — G8484 FLU IMMUNIZE NO ADMIN: HCPCS | Performed by: SURGERY

## 2020-01-01 PROCEDURE — 80048 BASIC METABOLIC PNL TOTAL CA: CPT

## 2020-01-01 PROCEDURE — 88307 TISSUE EXAM BY PATHOLOGIST: CPT

## 2020-01-01 PROCEDURE — 6370000000 HC RX 637 (ALT 250 FOR IP): Performed by: FAMILY MEDICINE

## 2020-01-01 PROCEDURE — 99233 SBSQ HOSP IP/OBS HIGH 50: CPT | Performed by: INTERNAL MEDICINE

## 2020-01-01 PROCEDURE — 86901 BLOOD TYPING SEROLOGIC RH(D): CPT

## 2020-01-01 PROCEDURE — 71045 X-RAY EXAM CHEST 1 VIEW: CPT

## 2020-01-01 PROCEDURE — 2580000003 HC RX 258: Performed by: NURSE PRACTITIONER

## 2020-01-01 PROCEDURE — 83540 ASSAY OF IRON: CPT

## 2020-01-01 PROCEDURE — 94640 AIRWAY INHALATION TREATMENT: CPT

## 2020-01-01 PROCEDURE — 2580000003 HC RX 258: Performed by: FAMILY MEDICINE

## 2020-01-01 PROCEDURE — 74177 CT ABD & PELVIS W/CONTRAST: CPT

## 2020-01-01 PROCEDURE — 85730 THROMBOPLASTIN TIME PARTIAL: CPT

## 2020-01-01 PROCEDURE — 6370000000 HC RX 637 (ALT 250 FOR IP): Performed by: NURSE PRACTITIONER

## 2020-01-01 PROCEDURE — 71250 CT THORAX DX C-: CPT

## 2020-01-01 PROCEDURE — 2580000003 HC RX 258: Performed by: EMERGENCY MEDICINE

## 2020-01-01 PROCEDURE — U0002 COVID-19 LAB TEST NON-CDC: HCPCS

## 2020-01-01 PROCEDURE — 7100000000 HC PACU RECOVERY - FIRST 15 MIN: Performed by: INTERNAL MEDICINE

## 2020-01-01 PROCEDURE — 2700000000 HC OXYGEN THERAPY PER DAY

## 2020-01-01 PROCEDURE — 2580000003 HC RX 258: Performed by: SURGERY

## 2020-01-01 PROCEDURE — 86900 BLOOD TYPING SEROLOGIC ABO: CPT

## 2020-01-01 PROCEDURE — 6360000002 HC RX W HCPCS: Performed by: STUDENT IN AN ORGANIZED HEALTH CARE EDUCATION/TRAINING PROGRAM

## 2020-01-01 PROCEDURE — 93970 EXTREMITY STUDY: CPT

## 2020-01-01 PROCEDURE — 6370000000 HC RX 637 (ALT 250 FOR IP): Performed by: SURGERY

## 2020-01-01 PROCEDURE — 83880 ASSAY OF NATRIURETIC PEPTIDE: CPT

## 2020-01-01 PROCEDURE — 99285 EMERGENCY DEPT VISIT HI MDM: CPT

## 2020-01-01 PROCEDURE — 73110 X-RAY EXAM OF WRIST: CPT

## 2020-01-01 PROCEDURE — 82962 GLUCOSE BLOOD TEST: CPT

## 2020-01-01 PROCEDURE — 93010 ELECTROCARDIOGRAM REPORT: CPT | Performed by: INTERNAL MEDICINE

## 2020-01-01 PROCEDURE — 2500000003 HC RX 250 WO HCPCS: Performed by: STUDENT IN AN ORGANIZED HEALTH CARE EDUCATION/TRAINING PROGRAM

## 2020-01-01 PROCEDURE — C9113 INJ PANTOPRAZOLE SODIUM, VIA: HCPCS | Performed by: NURSE PRACTITIONER

## 2020-01-01 PROCEDURE — 82607 VITAMIN B-12: CPT

## 2020-01-01 PROCEDURE — 2580000003 HC RX 258: Performed by: NURSE ANESTHETIST, CERTIFIED REGISTERED

## 2020-01-01 PROCEDURE — 3609027000 HC COLONOSCOPY: Performed by: SURGERY

## 2020-01-01 PROCEDURE — 3700000000 HC ANESTHESIA ATTENDED CARE: Performed by: SURGERY

## 2020-01-01 PROCEDURE — 86923 COMPATIBILITY TEST ELECTRIC: CPT

## 2020-01-01 PROCEDURE — 83615 LACTATE (LD) (LDH) ENZYME: CPT

## 2020-01-01 PROCEDURE — 85025 COMPLETE CBC W/AUTO DIFF WBC: CPT

## 2020-01-01 PROCEDURE — 87077 CULTURE AEROBIC IDENTIFY: CPT

## 2020-01-01 PROCEDURE — 97166 OT EVAL MOD COMPLEX 45 MIN: CPT

## 2020-01-01 PROCEDURE — 76937 US GUIDE VASCULAR ACCESS: CPT

## 2020-01-01 PROCEDURE — 83010 ASSAY OF HAPTOGLOBIN QUANT: CPT

## 2020-01-01 PROCEDURE — 96375 TX/PRO/DX INJ NEW DRUG ADDON: CPT

## 2020-01-01 PROCEDURE — G8400 PT W/DXA NO RESULTS DOC: HCPCS | Performed by: SURGERY

## 2020-01-01 PROCEDURE — 82570 ASSAY OF URINE CREATININE: CPT

## 2020-01-01 PROCEDURE — 85014 HEMATOCRIT: CPT

## 2020-01-01 PROCEDURE — C9113 INJ PANTOPRAZOLE SODIUM, VIA: HCPCS | Performed by: SURGERY

## 2020-01-01 PROCEDURE — 99024 POSTOP FOLLOW-UP VISIT: CPT | Performed by: PHYSICIAN ASSISTANT

## 2020-01-01 PROCEDURE — 97161 PT EVAL LOW COMPLEX 20 MIN: CPT | Performed by: PHYSICAL THERAPIST

## 2020-01-01 PROCEDURE — 85027 COMPLETE CBC AUTOMATED: CPT

## 2020-01-01 PROCEDURE — 99284 EMERGENCY DEPT VISIT MOD MDM: CPT

## 2020-01-01 PROCEDURE — 88342 IMHCHEM/IMCYTCHM 1ST ANTB: CPT

## 2020-01-01 PROCEDURE — 87040 BLOOD CULTURE FOR BACTERIA: CPT

## 2020-01-01 PROCEDURE — 4040F PNEUMOC VAC/ADMIN/RCVD: CPT | Performed by: SURGERY

## 2020-01-01 PROCEDURE — 3430000000 HC RX DIAGNOSTIC RADIOPHARMACEUTICAL: Performed by: RADIOLOGY

## 2020-01-01 PROCEDURE — U0003 INFECTIOUS AGENT DETECTION BY NUCLEIC ACID (DNA OR RNA); SEVERE ACUTE RESPIRATORY SYNDROME CORONAVIRUS 2 (SARS-COV-2) (CORONAVIRUS DISEASE [COVID-19]), AMPLIFIED PROBE TECHNIQUE, MAKING USE OF HIGH THROUGHPUT TECHNOLOGIES AS DESCRIBED BY CMS-2020-01-R: HCPCS

## 2020-01-01 PROCEDURE — 97530 THERAPEUTIC ACTIVITIES: CPT

## 2020-01-01 PROCEDURE — 3700000001 HC ADD 15 MINUTES (ANESTHESIA): Performed by: SURGERY

## 2020-01-01 PROCEDURE — 99232 SBSQ HOSP IP/OBS MODERATE 35: CPT | Performed by: INTERNAL MEDICINE

## 2020-01-01 PROCEDURE — 3600007511: Performed by: SURGERY

## 2020-01-01 PROCEDURE — 6360000002 HC RX W HCPCS: Performed by: FAMILY MEDICINE

## 2020-01-01 PROCEDURE — 3017F COLORECTAL CA SCREEN DOC REV: CPT | Performed by: SURGERY

## 2020-01-01 PROCEDURE — 0DJD8ZZ INSPECTION OF LOWER INTESTINAL TRACT, VIA NATURAL OR ARTIFICIAL OPENING ENDOSCOPIC: ICD-10-PCS | Performed by: SURGERY

## 2020-01-01 PROCEDURE — 87088 URINE BACTERIA CULTURE: CPT

## 2020-01-01 PROCEDURE — 85045 AUTOMATED RETICULOCYTE COUNT: CPT

## 2020-01-01 PROCEDURE — 87205 SMEAR GRAM STAIN: CPT

## 2020-01-01 PROCEDURE — 82330 ASSAY OF CALCIUM: CPT

## 2020-01-01 PROCEDURE — 99221 1ST HOSP IP/OBS SF/LOW 40: CPT | Performed by: NURSE PRACTITIONER

## 2020-01-01 PROCEDURE — 2709999900 HC NON-CHARGEABLE SUPPLY: Performed by: SURGERY

## 2020-01-01 PROCEDURE — 87150 DNA/RNA AMPLIFIED PROBE: CPT

## 2020-01-01 PROCEDURE — 97165 OT EVAL LOW COMPLEX 30 MIN: CPT

## 2020-01-01 PROCEDURE — 97530 THERAPEUTIC ACTIVITIES: CPT | Performed by: PHYSICAL THERAPIST

## 2020-01-01 PROCEDURE — 1123F ACP DISCUSS/DSCN MKR DOCD: CPT | Performed by: SURGERY

## 2020-01-01 PROCEDURE — 3700000000 HC ANESTHESIA ATTENDED CARE: Performed by: INTERNAL MEDICINE

## 2020-01-01 PROCEDURE — 97535 SELF CARE MNGMENT TRAINING: CPT

## 2020-01-01 PROCEDURE — 87186 SC STD MICRODIL/AGAR DIL: CPT

## 2020-01-01 PROCEDURE — 43239 EGD BIOPSY SINGLE/MULTIPLE: CPT | Performed by: SURGERY

## 2020-01-01 PROCEDURE — 6370000000 HC RX 637 (ALT 250 FOR IP): Performed by: STUDENT IN AN ORGANIZED HEALTH CARE EDUCATION/TRAINING PROGRAM

## 2020-01-01 PROCEDURE — 82746 ASSAY OF FOLIC ACID SERUM: CPT

## 2020-01-01 PROCEDURE — 93306 TTE W/DOPPLER COMPLETE: CPT

## 2020-01-01 PROCEDURE — 78278 ACUTE GI BLOOD LOSS IMAGING: CPT

## 2020-01-01 PROCEDURE — G8420 CALC BMI NORM PARAMETERS: HCPCS | Performed by: SURGERY

## 2020-01-01 PROCEDURE — 2500000003 HC RX 250 WO HCPCS

## 2020-01-01 PROCEDURE — 1111F DSCHRG MED/CURRENT MED MERGE: CPT | Performed by: SURGERY

## 2020-01-01 PROCEDURE — 45378 DIAGNOSTIC COLONOSCOPY: CPT | Performed by: SURGERY

## 2020-01-01 PROCEDURE — 96365 THER/PROPH/DIAG IV INF INIT: CPT

## 2020-01-01 PROCEDURE — 83550 IRON BINDING TEST: CPT

## 2020-01-01 PROCEDURE — 82728 ASSAY OF FERRITIN: CPT

## 2020-01-01 PROCEDURE — 84300 ASSAY OF URINE SODIUM: CPT

## 2020-01-01 PROCEDURE — 82140 ASSAY OF AMMONIA: CPT

## 2020-01-01 PROCEDURE — A9560 TC99M LABELED RBC: HCPCS | Performed by: RADIOLOGY

## 2020-01-01 PROCEDURE — 05HB33Z INSERTION OF INFUSION DEVICE INTO RIGHT BASILIC VEIN, PERCUTANEOUS APPROACH: ICD-10-PCS | Performed by: INTERNAL MEDICINE

## 2020-01-01 PROCEDURE — 99213 OFFICE O/P EST LOW 20 MIN: CPT | Performed by: SURGERY

## 2020-01-01 PROCEDURE — 96374 THER/PROPH/DIAG INJ IV PUSH: CPT

## 2020-01-01 PROCEDURE — 93005 ELECTROCARDIOGRAM TRACING: CPT | Performed by: STUDENT IN AN ORGANIZED HEALTH CARE EDUCATION/TRAINING PROGRAM

## 2020-01-01 PROCEDURE — 1090F PRES/ABSN URINE INCON ASSESS: CPT | Performed by: SURGERY

## 2020-01-01 PROCEDURE — 7100000001 HC PACU RECOVERY - ADDTL 15 MIN: Performed by: INTERNAL MEDICINE

## 2020-01-01 PROCEDURE — 70450 CT HEAD/BRAIN W/O DYE: CPT

## 2020-01-01 PROCEDURE — 6360000002 HC RX W HCPCS: Performed by: NURSE ANESTHETIST, CERTIFIED REGISTERED

## 2020-01-01 PROCEDURE — 88311 DECALCIFY TISSUE: CPT

## 2020-01-01 PROCEDURE — 83690 ASSAY OF LIPASE: CPT

## 2020-01-01 PROCEDURE — 74270 X-RAY XM COLON 1CNTRST STD: CPT

## 2020-01-01 PROCEDURE — 2709999900 HC NON-CHARGEABLE SUPPLY: Performed by: INTERNAL MEDICINE

## 2020-01-01 PROCEDURE — 3609017100 HC EGD: Performed by: INTERNAL MEDICINE

## 2020-01-01 PROCEDURE — 99222 1ST HOSP IP/OBS MODERATE 55: CPT | Performed by: SURGERY

## 2020-01-01 PROCEDURE — C1751 CATH, INF, PER/CENT/MIDLINE: HCPCS

## 2020-01-01 PROCEDURE — 93005 ELECTROCARDIOGRAM TRACING: CPT | Performed by: INTERNAL MEDICINE

## 2020-01-01 PROCEDURE — 88305 TISSUE EXAM BY PATHOLOGIST: CPT

## 2020-01-01 PROCEDURE — 36410 VNPNXR 3YR/> PHY/QHP DX/THER: CPT

## 2020-01-01 PROCEDURE — 2500000003 HC RX 250 WO HCPCS: Performed by: INTERNAL MEDICINE

## 2020-01-01 PROCEDURE — 7100000010 HC PHASE II RECOVERY - FIRST 15 MIN: Performed by: SURGERY

## 2020-01-01 PROCEDURE — 7100000011 HC PHASE II RECOVERY - ADDTL 15 MIN: Performed by: SURGERY

## 2020-01-01 PROCEDURE — 6360000002 HC RX W HCPCS: Performed by: SURGERY

## 2020-01-01 PROCEDURE — 84145 PROCALCITONIN (PCT): CPT

## 2020-01-01 PROCEDURE — 82550 ASSAY OF CK (CPK): CPT

## 2020-01-01 PROCEDURE — 74176 CT ABD & PELVIS W/O CONTRAST: CPT

## 2020-01-01 PROCEDURE — 97162 PT EVAL MOD COMPLEX 30 MIN: CPT

## 2020-01-01 RX ORDER — DEXTROSE MONOHYDRATE 25 G/50ML
25 INJECTION, SOLUTION INTRAVENOUS ONCE
Status: COMPLETED | OUTPATIENT
Start: 2020-01-01 | End: 2020-01-01

## 2020-01-01 RX ORDER — SODIUM CHLORIDE 0.9 % (FLUSH) 0.9 %
20 SYRINGE (ML) INJECTION PRN
Status: CANCELLED | OUTPATIENT
Start: 2020-01-01

## 2020-01-01 RX ORDER — SODIUM CHLORIDE 0.9 % (FLUSH) 0.9 %
10 SYRINGE (ML) INJECTION PRN
Status: DISCONTINUED | OUTPATIENT
Start: 2020-01-01 | End: 2020-01-01 | Stop reason: SDUPTHER

## 2020-01-01 RX ORDER — PANTOPRAZOLE SODIUM 40 MG/10ML
40 INJECTION, POWDER, LYOPHILIZED, FOR SOLUTION INTRAVENOUS DAILY
Status: DISCONTINUED | OUTPATIENT
Start: 2020-01-01 | End: 2020-01-01

## 2020-01-01 RX ORDER — EPINEPHRINE 1 MG/ML
0.3 INJECTION, SOLUTION, CONCENTRATE INTRAVENOUS PRN
Status: CANCELLED | OUTPATIENT
Start: 2020-01-01

## 2020-01-01 RX ORDER — 0.9 % SODIUM CHLORIDE 0.9 %
250 INTRAVENOUS SOLUTION INTRAVENOUS ONCE
Status: COMPLETED | OUTPATIENT
Start: 2020-01-01 | End: 2020-01-01

## 2020-01-01 RX ORDER — POTASSIUM CHLORIDE 20 MEQ/1
40 TABLET, EXTENDED RELEASE ORAL 2 TIMES DAILY WITH MEALS
Status: DISCONTINUED | OUTPATIENT
Start: 2020-01-01 | End: 2020-01-01

## 2020-01-01 RX ORDER — PROPOFOL 10 MG/ML
INJECTION, EMULSION INTRAVENOUS PRN
Status: DISCONTINUED | OUTPATIENT
Start: 2020-01-01 | End: 2020-01-01 | Stop reason: SDUPTHER

## 2020-01-01 RX ORDER — SODIUM CHLORIDE 0.9 % (FLUSH) 0.9 %
10 SYRINGE (ML) INJECTION EVERY 12 HOURS SCHEDULED
Status: DISCONTINUED | OUTPATIENT
Start: 2020-01-01 | End: 2020-01-01 | Stop reason: HOSPADM

## 2020-01-01 RX ORDER — FERROUS SULFATE 325(65) MG
325 TABLET ORAL
Status: DISCONTINUED | OUTPATIENT
Start: 2020-01-01 | End: 2020-01-01 | Stop reason: HOSPADM

## 2020-01-01 RX ORDER — SODIUM CHLORIDE 9 MG/ML
INJECTION, SOLUTION INTRAVENOUS CONTINUOUS
Status: CANCELLED | OUTPATIENT
Start: 2020-01-01

## 2020-01-01 RX ORDER — SIMVASTATIN 20 MG
20 TABLET ORAL EVERY MORNING
COMMUNITY
End: 2020-01-01

## 2020-01-01 RX ORDER — LIDOCAINE HYDROCHLORIDE 20 MG/ML
INJECTION, SOLUTION EPIDURAL; INFILTRATION; INTRACAUDAL; PERINEURAL PRN
Status: DISCONTINUED | OUTPATIENT
Start: 2020-01-01 | End: 2020-01-01 | Stop reason: SDUPTHER

## 2020-01-01 RX ORDER — POTASSIUM CHLORIDE 20 MEQ/1
20 TABLET, EXTENDED RELEASE ORAL 2 TIMES DAILY WITH MEALS
Qty: 60 TABLET | Refills: 3 | Status: ON HOLD
Start: 2020-01-01 | End: 2020-01-01 | Stop reason: HOSPADM

## 2020-01-01 RX ORDER — DIPHENHYDRAMINE HCL 25 MG
25 TABLET ORAL ONCE
Status: CANCELLED | OUTPATIENT
Start: 2020-01-01

## 2020-01-01 RX ORDER — 0.9 % SODIUM CHLORIDE 0.9 %
250 INTRAVENOUS SOLUTION INTRAVENOUS ONCE
Status: CANCELLED | OUTPATIENT
Start: 2020-01-01

## 2020-01-01 RX ORDER — 0.9 % SODIUM CHLORIDE 0.9 %
20 INTRAVENOUS SOLUTION INTRAVENOUS ONCE
Status: DISCONTINUED | OUTPATIENT
Start: 2020-01-01 | End: 2020-01-01 | Stop reason: HOSPADM

## 2020-01-01 RX ORDER — PROPOFOL 10 MG/ML
INJECTION, EMULSION INTRAVENOUS CONTINUOUS PRN
Status: DISCONTINUED | OUTPATIENT
Start: 2020-01-01 | End: 2020-01-01 | Stop reason: SDUPTHER

## 2020-01-01 RX ORDER — DIPHENHYDRAMINE HYDROCHLORIDE 50 MG/ML
50 INJECTION INTRAMUSCULAR; INTRAVENOUS ONCE
Status: CANCELLED | OUTPATIENT
Start: 2020-01-01

## 2020-01-01 RX ORDER — FUROSEMIDE 10 MG/ML
20 INJECTION INTRAMUSCULAR; INTRAVENOUS SEE ADMIN INSTRUCTIONS
Status: COMPLETED | OUTPATIENT
Start: 2020-01-01 | End: 2020-01-01

## 2020-01-01 RX ORDER — METOPROLOL TARTRATE 5 MG/5ML
5 INJECTION INTRAVENOUS EVERY 6 HOURS
Status: DISCONTINUED | OUTPATIENT
Start: 2020-01-01 | End: 2020-01-01

## 2020-01-01 RX ORDER — SODIUM CHLORIDE 9 MG/ML
10 INJECTION INTRAVENOUS 2 TIMES DAILY
Status: DISCONTINUED | OUTPATIENT
Start: 2020-01-01 | End: 2020-01-01

## 2020-01-01 RX ORDER — ACETAMINOPHEN 325 MG/1
650 TABLET ORAL ONCE
Status: CANCELLED | OUTPATIENT
Start: 2020-01-01

## 2020-01-01 RX ORDER — MAGNESIUM SULFATE IN WATER 40 MG/ML
2 INJECTION, SOLUTION INTRAVENOUS ONCE
Status: COMPLETED | OUTPATIENT
Start: 2020-01-01 | End: 2020-01-01

## 2020-01-01 RX ORDER — SODIUM CHLORIDE 9 MG/ML
INJECTION, SOLUTION INTRAVENOUS CONTINUOUS PRN
Status: DISCONTINUED | OUTPATIENT
Start: 2020-01-01 | End: 2020-01-01 | Stop reason: SDUPTHER

## 2020-01-01 RX ORDER — CLONAZEPAM 0.5 MG/1
1 TABLET ORAL 2 TIMES DAILY PRN
Status: DISCONTINUED | OUTPATIENT
Start: 2020-01-01 | End: 2020-01-01 | Stop reason: HOSPADM

## 2020-01-01 RX ORDER — METOPROLOL TARTRATE 37.5 MG/1
37.5 TABLET, FILM COATED ORAL 2 TIMES DAILY
Qty: 60 TABLET | Refills: 3 | Status: SHIPPED | OUTPATIENT
Start: 2020-01-01

## 2020-01-01 RX ORDER — DEXTROSE MONOHYDRATE 50 MG/ML
100 INJECTION, SOLUTION INTRAVENOUS PRN
Status: DISCONTINUED | OUTPATIENT
Start: 2020-01-01 | End: 2020-01-01 | Stop reason: HOSPADM

## 2020-01-01 RX ORDER — SODIUM CHLORIDE 9 MG/ML
INJECTION, SOLUTION INTRAVENOUS CONTINUOUS
Status: DISCONTINUED | OUTPATIENT
Start: 2020-01-01 | End: 2020-01-01

## 2020-01-01 RX ORDER — FERROUS SULFATE 325(65) MG
325 TABLET ORAL
COMMUNITY

## 2020-01-01 RX ORDER — M-VIT,TX,IRON,MINS/CALC/FOLIC 27MG-0.4MG
1 TABLET ORAL DAILY
Status: DISCONTINUED | OUTPATIENT
Start: 2020-01-01 | End: 2020-01-01 | Stop reason: HOSPADM

## 2020-01-01 RX ORDER — DEXTROSE MONOHYDRATE 25 G/50ML
12.5 INJECTION, SOLUTION INTRAVENOUS PRN
Status: DISCONTINUED | OUTPATIENT
Start: 2020-01-01 | End: 2020-01-01 | Stop reason: HOSPADM

## 2020-01-01 RX ORDER — FAMOTIDINE 20 MG/1
20 TABLET, FILM COATED ORAL DAILY
Status: DISCONTINUED | OUTPATIENT
Start: 2020-01-01 | End: 2020-01-01 | Stop reason: HOSPADM

## 2020-01-01 RX ORDER — ACETAMINOPHEN 160 MG
4000 TABLET,DISINTEGRATING ORAL EVERY MORNING
COMMUNITY

## 2020-01-01 RX ORDER — POTASSIUM CHLORIDE 20 MEQ/1
20 TABLET, EXTENDED RELEASE ORAL 2 TIMES DAILY WITH MEALS
Status: DISCONTINUED | OUTPATIENT
Start: 2020-01-01 | End: 2020-01-01 | Stop reason: HOSPADM

## 2020-01-01 RX ORDER — ACETAMINOPHEN 325 MG/1
650 TABLET ORAL EVERY 6 HOURS PRN
Status: DISCONTINUED | OUTPATIENT
Start: 2020-01-01 | End: 2020-01-01 | Stop reason: HOSPADM

## 2020-01-01 RX ORDER — 0.9 % SODIUM CHLORIDE 0.9 %
20 INTRAVENOUS SOLUTION INTRAVENOUS ONCE
Status: COMPLETED | OUTPATIENT
Start: 2020-01-01 | End: 2020-01-01

## 2020-01-01 RX ORDER — SODIUM CHLORIDE 0.9 % (FLUSH) 0.9 %
10 SYRINGE (ML) INJECTION EVERY 12 HOURS SCHEDULED
Status: DISCONTINUED | OUTPATIENT
Start: 2020-01-01 | End: 2020-01-01 | Stop reason: SDUPTHER

## 2020-01-01 RX ORDER — 0.9 % SODIUM CHLORIDE 0.9 %
500 INTRAVENOUS SOLUTION INTRAVENOUS ONCE
Status: COMPLETED | OUTPATIENT
Start: 2020-01-01 | End: 2020-01-01

## 2020-01-01 RX ORDER — SODIUM CHLORIDE 9 MG/ML
INJECTION, SOLUTION INTRAVENOUS CONTINUOUS
Status: DISCONTINUED | OUTPATIENT
Start: 2020-01-01 | End: 2020-01-01 | Stop reason: HOSPADM

## 2020-01-01 RX ORDER — CLONAZEPAM 0.5 MG/1
1 TABLET ORAL NIGHTLY
Status: DISCONTINUED | OUTPATIENT
Start: 2020-01-01 | End: 2020-01-01

## 2020-01-01 RX ORDER — METOPROLOL TARTRATE 5 MG/5ML
INJECTION INTRAVENOUS
Status: COMPLETED
Start: 2020-01-01 | End: 2020-01-01

## 2020-01-01 RX ORDER — CLONAZEPAM 2 MG/1
1 TABLET ORAL NIGHTLY
Status: ON HOLD | COMMUNITY
Start: 2019-01-01 | End: 2020-01-01 | Stop reason: HOSPADM

## 2020-01-01 RX ORDER — CLONAZEPAM 0.5 MG/1
1 TABLET ORAL NIGHTLY
Status: DISCONTINUED | OUTPATIENT
Start: 2020-01-01 | End: 2020-01-01 | Stop reason: HOSPADM

## 2020-01-01 RX ORDER — SODIUM CHLORIDE, SODIUM LACTATE, POTASSIUM CHLORIDE, CALCIUM CHLORIDE 600; 310; 30; 20 MG/100ML; MG/100ML; MG/100ML; MG/100ML
INJECTION, SOLUTION INTRAVENOUS CONTINUOUS PRN
Status: DISCONTINUED | OUTPATIENT
Start: 2020-01-01 | End: 2020-01-01 | Stop reason: SDUPTHER

## 2020-01-01 RX ORDER — DONEPEZIL HYDROCHLORIDE 5 MG/1
5 TABLET, FILM COATED ORAL
Status: DISCONTINUED | OUTPATIENT
Start: 2020-01-01 | End: 2020-01-01 | Stop reason: HOSPADM

## 2020-01-01 RX ORDER — SODIUM PHOSPHATE, DIBASIC AND SODIUM PHOSPHATE, MONOBASIC 7; 19 G/133ML; G/133ML
1 ENEMA RECTAL
Status: ACTIVE | OUTPATIENT
Start: 2020-01-01 | End: 2020-01-01

## 2020-01-01 RX ORDER — LUBIPROSTONE 8 UG/1
8 CAPSULE, GELATIN COATED ORAL 2 TIMES DAILY
Qty: 60 CAPSULE | Refills: 3 | Status: SHIPPED | OUTPATIENT
Start: 2020-01-01

## 2020-01-01 RX ORDER — SODIUM CHLORIDE 9 MG/ML
INJECTION, SOLUTION INTRAVENOUS CONTINUOUS
Status: ACTIVE | OUTPATIENT
Start: 2020-01-01 | End: 2020-01-01

## 2020-01-01 RX ORDER — POLYETHYLENE GLYCOL 3350 17 G/17G
17 POWDER, FOR SOLUTION ORAL 2 TIMES DAILY
Status: DISCONTINUED | OUTPATIENT
Start: 2020-01-01 | End: 2020-01-01 | Stop reason: HOSPADM

## 2020-01-01 RX ORDER — 0.9 % SODIUM CHLORIDE 0.9 %
1000 INTRAVENOUS SOLUTION INTRAVENOUS ONCE
Status: COMPLETED | OUTPATIENT
Start: 2020-01-01 | End: 2020-01-01

## 2020-01-01 RX ORDER — ACETAMINOPHEN 650 MG/1
650 SUPPOSITORY RECTAL EVERY 6 HOURS PRN
Status: DISCONTINUED | OUTPATIENT
Start: 2020-01-01 | End: 2020-01-01 | Stop reason: HOSPADM

## 2020-01-01 RX ORDER — PANTOPRAZOLE SODIUM 40 MG/10ML
40 INJECTION, POWDER, LYOPHILIZED, FOR SOLUTION INTRAVENOUS 2 TIMES DAILY
Status: DISCONTINUED | OUTPATIENT
Start: 2020-01-01 | End: 2020-01-01

## 2020-01-01 RX ORDER — SODIUM CHLORIDE 9 MG/ML
10 INJECTION INTRAVENOUS DAILY
Status: DISCONTINUED | OUTPATIENT
Start: 2020-01-01 | End: 2020-01-01 | Stop reason: HOSPADM

## 2020-01-01 RX ORDER — METOPROLOL TARTRATE 5 MG/5ML
5 INJECTION INTRAVENOUS ONCE
Status: COMPLETED | OUTPATIENT
Start: 2020-01-01 | End: 2020-01-01

## 2020-01-01 RX ORDER — PROMETHAZINE HYDROCHLORIDE 25 MG/1
12.5 TABLET ORAL EVERY 6 HOURS PRN
Status: DISCONTINUED | OUTPATIENT
Start: 2020-01-01 | End: 2020-01-01 | Stop reason: HOSPADM

## 2020-01-01 RX ORDER — LACTULOSE 10 G/15ML
10 SOLUTION ORAL 2 TIMES DAILY
Status: DISCONTINUED | OUTPATIENT
Start: 2020-01-01 | End: 2020-01-01 | Stop reason: HOSPADM

## 2020-01-01 RX ORDER — PANTOPRAZOLE SODIUM 40 MG/1
40 TABLET, DELAYED RELEASE ORAL
Status: DISCONTINUED | OUTPATIENT
Start: 2020-01-01 | End: 2020-01-01 | Stop reason: HOSPADM

## 2020-01-01 RX ORDER — DONEPEZIL HYDROCHLORIDE 5 MG/1
5 TABLET, FILM COATED ORAL
COMMUNITY

## 2020-01-01 RX ORDER — ACETAMINOPHEN 325 MG/1
650 TABLET ORAL ONCE
Status: DISCONTINUED | OUTPATIENT
Start: 2020-01-01 | End: 2020-01-01 | Stop reason: HOSPADM

## 2020-01-01 RX ORDER — ONDANSETRON 2 MG/ML
4 INJECTION INTRAMUSCULAR; INTRAVENOUS EVERY 6 HOURS PRN
Status: DISCONTINUED | OUTPATIENT
Start: 2020-01-01 | End: 2020-01-01 | Stop reason: HOSPADM

## 2020-01-01 RX ORDER — LUBIPROSTONE 8 UG/1
8 CAPSULE, GELATIN COATED ORAL 2 TIMES DAILY
Status: DISCONTINUED | OUTPATIENT
Start: 2020-01-01 | End: 2020-01-01 | Stop reason: HOSPADM

## 2020-01-01 RX ORDER — CHOLECALCIFEROL (VITAMIN D3) 50 MCG
2000 TABLET ORAL EVERY MORNING
Status: DISCONTINUED | OUTPATIENT
Start: 2020-01-01 | End: 2020-01-01 | Stop reason: HOSPADM

## 2020-01-01 RX ORDER — DIPHENHYDRAMINE HCL 25 MG
25 TABLET ORAL ONCE
Status: COMPLETED | OUTPATIENT
Start: 2020-01-01 | End: 2020-01-01

## 2020-01-01 RX ORDER — ACETAMINOPHEN 500 MG
1000 TABLET ORAL 2 TIMES DAILY PRN
COMMUNITY

## 2020-01-01 RX ORDER — SODIUM CHLORIDE 0.9 % (FLUSH) 0.9 %
20 SYRINGE (ML) INJECTION PRN
Status: DISCONTINUED | OUTPATIENT
Start: 2020-01-01 | End: 2020-01-01 | Stop reason: HOSPADM

## 2020-01-01 RX ORDER — METHYLPREDNISOLONE SODIUM SUCCINATE 125 MG/2ML
125 INJECTION, POWDER, LYOPHILIZED, FOR SOLUTION INTRAMUSCULAR; INTRAVENOUS ONCE
Status: CANCELLED | OUTPATIENT
Start: 2020-01-01

## 2020-01-01 RX ORDER — ALBUTEROL SULFATE 2.5 MG/3ML
10 SOLUTION RESPIRATORY (INHALATION) ONCE
Status: COMPLETED | OUTPATIENT
Start: 2020-01-01 | End: 2020-01-01

## 2020-01-01 RX ORDER — SODIUM CHLORIDE 0.9 % (FLUSH) 0.9 %
10 SYRINGE (ML) INJECTION PRN
Status: DISCONTINUED | OUTPATIENT
Start: 2020-01-01 | End: 2020-01-01 | Stop reason: HOSPADM

## 2020-01-01 RX ORDER — POTASSIUM CHLORIDE 20 MEQ/1
40 TABLET, EXTENDED RELEASE ORAL ONCE
Status: COMPLETED | OUTPATIENT
Start: 2020-01-01 | End: 2020-01-01

## 2020-01-01 RX ORDER — POTASSIUM CHLORIDE 20 MEQ/1
40 TABLET, EXTENDED RELEASE ORAL 2 TIMES DAILY WITH MEALS
Status: ACTIVE | OUTPATIENT
Start: 2020-01-01 | End: 2020-01-01

## 2020-01-01 RX ORDER — SODIUM PHOSPHATE, DIBASIC AND SODIUM PHOSPHATE, MONOBASIC 7; 19 G/133ML; G/133ML
1 ENEMA RECTAL ONCE
Status: COMPLETED | OUTPATIENT
Start: 2020-01-01 | End: 2020-01-01

## 2020-01-01 RX ORDER — FAMOTIDINE 20 MG/1
20 TABLET, FILM COATED ORAL DAILY
Qty: 60 TABLET | Refills: 3 | Status: SHIPPED | OUTPATIENT
Start: 2020-01-01

## 2020-01-01 RX ORDER — SODIUM CHLORIDE 450 MG/100ML
INJECTION, SOLUTION INTRAVENOUS CONTINUOUS
Status: DISCONTINUED | OUTPATIENT
Start: 2020-01-01 | End: 2020-01-01

## 2020-01-01 RX ORDER — CALCIUM GLUCONATE 94 MG/ML
1 INJECTION, SOLUTION INTRAVENOUS ONCE
Status: COMPLETED | OUTPATIENT
Start: 2020-01-01 | End: 2020-01-01

## 2020-01-01 RX ORDER — POLYETHYLENE GLYCOL 3350 17 G/17G
17 POWDER, FOR SOLUTION ORAL DAILY PRN
Status: DISCONTINUED | OUTPATIENT
Start: 2020-01-01 | End: 2020-01-01

## 2020-01-01 RX ORDER — ACETAMINOPHEN 325 MG/1
650 TABLET ORAL EVERY 4 HOURS PRN
Status: DISCONTINUED | OUTPATIENT
Start: 2020-01-01 | End: 2020-01-01 | Stop reason: HOSPADM

## 2020-01-01 RX ORDER — POTASSIUM CHLORIDE 7.45 MG/ML
10 INJECTION INTRAVENOUS
Status: COMPLETED | OUTPATIENT
Start: 2020-01-01 | End: 2020-01-01

## 2020-01-01 RX ORDER — OMEPRAZOLE 20 MG/1
40 CAPSULE, DELAYED RELEASE ORAL EVERY EVENING
COMMUNITY
End: 2020-01-01

## 2020-01-01 RX ORDER — PANTOPRAZOLE SODIUM 40 MG/1
40 TABLET, DELAYED RELEASE ORAL
Status: DISCONTINUED | OUTPATIENT
Start: 2020-01-01 | End: 2020-01-01

## 2020-01-01 RX ORDER — LORAZEPAM 0.5 MG/1
0.5 TABLET ORAL NIGHTLY PRN
Status: DISCONTINUED | OUTPATIENT
Start: 2020-01-01 | End: 2020-01-01 | Stop reason: HOSPADM

## 2020-01-01 RX ORDER — ACETAMINOPHEN 325 MG/1
650 TABLET ORAL EVERY 4 HOURS PRN
Status: DISCONTINUED | OUTPATIENT
Start: 2020-01-01 | End: 2020-01-01 | Stop reason: SDUPTHER

## 2020-01-01 RX ORDER — NICOTINE POLACRILEX 4 MG
15 LOZENGE BUCCAL PRN
Status: DISCONTINUED | OUTPATIENT
Start: 2020-01-01 | End: 2020-01-01 | Stop reason: HOSPADM

## 2020-01-01 RX ORDER — ACETAMINOPHEN 500 MG
1000 TABLET ORAL 2 TIMES DAILY PRN
Status: DISCONTINUED | OUTPATIENT
Start: 2020-01-01 | End: 2020-01-01 | Stop reason: SDUPTHER

## 2020-01-01 RX ADMIN — SODIUM CHLORIDE: 9 INJECTION, SOLUTION INTRAVENOUS at 12:41

## 2020-01-01 RX ADMIN — FERROUS SULFATE TAB 325 MG (65 MG ELEMENTAL FE) 325 MG: 325 (65 FE) TAB at 10:07

## 2020-01-01 RX ADMIN — SODIUM CHLORIDE: 9 INJECTION, SOLUTION INTRAVENOUS at 04:42

## 2020-01-01 RX ADMIN — POTASSIUM CHLORIDE 20 MEQ: 20 TABLET, EXTENDED RELEASE ORAL at 16:34

## 2020-01-01 RX ADMIN — LORAZEPAM 0.5 MG: 0.5 TABLET ORAL at 20:40

## 2020-01-01 RX ADMIN — Medication 1 TABLET: at 15:07

## 2020-01-01 RX ADMIN — Medication 10 ML: at 08:03

## 2020-01-01 RX ADMIN — LUBIPROSTONE 8 MCG: 8 CAPSULE, GELATIN COATED ORAL at 20:40

## 2020-01-01 RX ADMIN — SODIUM CHLORIDE: 9 INJECTION, SOLUTION INTRAVENOUS at 06:22

## 2020-01-01 RX ADMIN — FERROUS SULFATE TAB 325 MG (65 MG ELEMENTAL FE) 325 MG: 325 (65 FE) TAB at 11:56

## 2020-01-01 RX ADMIN — CLONAZEPAM 1 MG: 0.5 TABLET ORAL at 20:24

## 2020-01-01 RX ADMIN — SODIUM CHLORIDE: 9 INJECTION, SOLUTION INTRAVENOUS at 00:21

## 2020-01-01 RX ADMIN — FERROUS SULFATE TAB 325 MG (65 MG ELEMENTAL FE) 325 MG: 325 (65 FE) TAB at 16:57

## 2020-01-01 RX ADMIN — POTASSIUM BICARBONATE 40 MEQ: 782 TABLET, EFFERVESCENT ORAL at 09:07

## 2020-01-01 RX ADMIN — SODIUM CHLORIDE, PRESERVATIVE FREE 10 ML: 5 INJECTION INTRAVENOUS at 22:02

## 2020-01-01 RX ADMIN — POTASSIUM CHLORIDE 20 MEQ: 20 TABLET, EXTENDED RELEASE ORAL at 17:46

## 2020-01-01 RX ADMIN — POTASSIUM CHLORIDE 10 MEQ: 10 INJECTION, SOLUTION INTRAVENOUS at 16:14

## 2020-01-01 RX ADMIN — SODIUM CHLORIDE, PRESERVATIVE FREE 10 ML: 5 INJECTION INTRAVENOUS at 10:21

## 2020-01-01 RX ADMIN — CLONAZEPAM 1 MG: 0.5 TABLET ORAL at 20:41

## 2020-01-01 RX ADMIN — PANTOPRAZOLE SODIUM 40 MG: 40 INJECTION, POWDER, FOR SOLUTION INTRAVENOUS at 19:56

## 2020-01-01 RX ADMIN — MAGNESIUM SULFATE HEPTAHYDRATE 2 G: 40 INJECTION, SOLUTION INTRAVENOUS at 13:52

## 2020-01-01 RX ADMIN — POLYETHYLENE GLYCOL 3350 17 G: 17 POWDER, FOR SOLUTION ORAL at 08:50

## 2020-01-01 RX ADMIN — ERTAPENEM SODIUM 1000 MG: 1 INJECTION, POWDER, LYOPHILIZED, FOR SOLUTION INTRAMUSCULAR; INTRAVENOUS at 09:47

## 2020-01-01 RX ADMIN — DONEPEZIL HYDROCHLORIDE 5 MG: 5 TABLET, FILM COATED ORAL at 16:14

## 2020-01-01 RX ADMIN — CHOLECALCIFEROL TAB 50 MCG (2000 UNIT) 2000 UNITS: 50 TAB at 08:58

## 2020-01-01 RX ADMIN — POLYETHYLENE GLYCOL 3350 17 G: 17 POWDER, FOR SOLUTION ORAL at 20:42

## 2020-01-01 RX ADMIN — LUBIPROSTONE 8 MCG: 8 CAPSULE, GELATIN COATED ORAL at 21:50

## 2020-01-01 RX ADMIN — CLONAZEPAM 1 MG: 0.5 TABLET ORAL at 21:42

## 2020-01-01 RX ADMIN — LUBIPROSTONE 8 MCG: 8 CAPSULE, GELATIN COATED ORAL at 07:54

## 2020-01-01 RX ADMIN — SODIUM CHLORIDE, PRESERVATIVE FREE 10 ML: 5 INJECTION INTRAVENOUS at 02:31

## 2020-01-01 RX ADMIN — POTASSIUM CHLORIDE 20 MEQ: 20 TABLET, EXTENDED RELEASE ORAL at 08:58

## 2020-01-01 RX ADMIN — SODIUM CHLORIDE: 9 INJECTION, SOLUTION INTRAVENOUS at 23:35

## 2020-01-01 RX ADMIN — INSULIN HUMAN 10 UNITS: 100 INJECTION, SOLUTION PARENTERAL at 20:05

## 2020-01-01 RX ADMIN — FERROUS SULFATE TAB 325 MG (65 MG ELEMENTAL FE) 325 MG: 325 (65 FE) TAB at 08:50

## 2020-01-01 RX ADMIN — CLONAZEPAM 1 MG: 0.5 TABLET ORAL at 22:02

## 2020-01-01 RX ADMIN — SODIUM BICARBONATE 50 MEQ: 84 INJECTION INTRAVENOUS at 20:03

## 2020-01-01 RX ADMIN — SODIUM CHLORIDE: 9 INJECTION, SOLUTION INTRAVENOUS at 10:34

## 2020-01-01 RX ADMIN — METOPROLOL TARTRATE 37.5 MG: 25 TABLET, FILM COATED ORAL at 07:54

## 2020-01-01 RX ADMIN — FERROUS SULFATE TAB 325 MG (65 MG ELEMENTAL FE) 325 MG: 325 (65 FE) TAB at 17:34

## 2020-01-01 RX ADMIN — PANTOPRAZOLE SODIUM 40 MG: 40 INJECTION, POWDER, FOR SOLUTION INTRAVENOUS at 08:58

## 2020-01-01 RX ADMIN — LACTULOSE 10 G: 20 SOLUTION ORAL at 10:08

## 2020-01-01 RX ADMIN — POTASSIUM CHLORIDE 20 MEQ: 20 TABLET, EXTENDED RELEASE ORAL at 10:07

## 2020-01-01 RX ADMIN — POLYETHYLENE GLYCOL 3350 17 G: 17 POWDER, FOR SOLUTION ORAL at 08:59

## 2020-01-01 RX ADMIN — SODIUM CHLORIDE 250 ML: 9 INJECTION, SOLUTION INTRAVENOUS at 10:20

## 2020-01-01 RX ADMIN — PANTOPRAZOLE SODIUM 40 MG: 40 INJECTION, POWDER, FOR SOLUTION INTRAVENOUS at 22:03

## 2020-01-01 RX ADMIN — LUBIPROSTONE 8 MCG: 8 CAPSULE, GELATIN COATED ORAL at 20:46

## 2020-01-01 RX ADMIN — METOPROLOL TARTRATE 37.5 MG: 25 TABLET, FILM COATED ORAL at 21:50

## 2020-01-01 RX ADMIN — PANTOPRAZOLE SODIUM 40 MG: 40 TABLET, DELAYED RELEASE ORAL at 06:52

## 2020-01-01 RX ADMIN — PANTOPRAZOLE SODIUM 40 MG: 40 TABLET, DELAYED RELEASE ORAL at 05:26

## 2020-01-01 RX ADMIN — SODIUM CHLORIDE 250 ML: 9 INJECTION, SOLUTION INTRAVENOUS at 11:13

## 2020-01-01 RX ADMIN — SODIUM CHLORIDE 1000 ML: 9 INJECTION, SOLUTION INTRAVENOUS at 19:08

## 2020-01-01 RX ADMIN — POTASSIUM CHLORIDE 20 MEQ: 20 TABLET, EXTENDED RELEASE ORAL at 17:34

## 2020-01-01 RX ADMIN — FAMOTIDINE 20 MG: 20 TABLET, FILM COATED ORAL at 08:49

## 2020-01-01 RX ADMIN — SODIUM CHLORIDE: 9 INJECTION, SOLUTION INTRAVENOUS at 16:43

## 2020-01-01 RX ADMIN — METOPROLOL TARTRATE 37.5 MG: 25 TABLET, FILM COATED ORAL at 10:07

## 2020-01-01 RX ADMIN — FUROSEMIDE 20 MG: 10 INJECTION, SOLUTION INTRAMUSCULAR; INTRAVENOUS at 19:24

## 2020-01-01 RX ADMIN — PANTOPRAZOLE SODIUM 40 MG: 40 INJECTION, POWDER, FOR SOLUTION INTRAVENOUS at 08:56

## 2020-01-01 RX ADMIN — LUBIPROSTONE 8 MCG: 8 CAPSULE, GELATIN COATED ORAL at 09:07

## 2020-01-01 RX ADMIN — DEXTROSE MONOHYDRATE 25 G: 25 INJECTION, SOLUTION INTRAVENOUS at 20:03

## 2020-01-01 RX ADMIN — METOPROLOL TARTRATE 25 MG: 25 TABLET, FILM COATED ORAL at 08:49

## 2020-01-01 RX ADMIN — FERROUS SULFATE TAB 325 MG (65 MG ELEMENTAL FE) 325 MG: 325 (65 FE) TAB at 17:46

## 2020-01-01 RX ADMIN — FERROUS SULFATE TAB 325 MG (65 MG ELEMENTAL FE) 325 MG: 325 (65 FE) TAB at 11:31

## 2020-01-01 RX ADMIN — SODIUM CHLORIDE: 4.5 INJECTION, SOLUTION INTRAVENOUS at 23:41

## 2020-01-01 RX ADMIN — SODIUM CHLORIDE, PRESERVATIVE FREE 10 ML: 5 INJECTION INTRAVENOUS at 08:45

## 2020-01-01 RX ADMIN — PROPOFOL 50 MG: 10 INJECTION, EMULSION INTRAVENOUS at 08:05

## 2020-01-01 RX ADMIN — FERROUS SULFATE TAB 325 MG (65 MG ELEMENTAL FE) 325 MG: 325 (65 FE) TAB at 08:58

## 2020-01-01 RX ADMIN — Medication 10 ML: at 09:39

## 2020-01-01 RX ADMIN — POLYETHYLENE GLYCOL 3350 17 G: 17 POWDER, FOR SOLUTION ORAL at 21:42

## 2020-01-01 RX ADMIN — POLYETHYLENE GLYCOL 3350 17 G: 17 POWDER, FOR SOLUTION ORAL at 22:02

## 2020-01-01 RX ADMIN — CHOLECALCIFEROL TAB 50 MCG (2000 UNIT) 2000 UNITS: 50 TAB at 08:49

## 2020-01-01 RX ADMIN — Medication 10 ML: at 08:57

## 2020-01-01 RX ADMIN — DONEPEZIL HYDROCHLORIDE 5 MG: 5 TABLET, FILM COATED ORAL at 16:19

## 2020-01-01 RX ADMIN — Medication 1 TABLET: at 08:49

## 2020-01-01 RX ADMIN — SODIUM CHLORIDE: 9 INJECTION, SOLUTION INTRAVENOUS at 17:46

## 2020-01-01 RX ADMIN — PROPOFOL 50 MG: 10 INJECTION, EMULSION INTRAVENOUS at 08:11

## 2020-01-01 RX ADMIN — FERROUS SULFATE TAB 325 MG (65 MG ELEMENTAL FE) 325 MG: 325 (65 FE) TAB at 12:43

## 2020-01-01 RX ADMIN — SODIUM CHLORIDE: 9 INJECTION, SOLUTION INTRAVENOUS at 14:47

## 2020-01-01 RX ADMIN — SODIUM CHLORIDE: 9 INJECTION, SOLUTION INTRAVENOUS at 03:03

## 2020-01-01 RX ADMIN — SODIUM CHLORIDE, PRESERVATIVE FREE 10 ML: 5 INJECTION INTRAVENOUS at 09:31

## 2020-01-01 RX ADMIN — FERROUS SULFATE TAB 325 MG (65 MG ELEMENTAL FE) 325 MG: 325 (65 FE) TAB at 12:14

## 2020-01-01 RX ADMIN — CLONAZEPAM 1 MG: 0.5 TABLET ORAL at 18:07

## 2020-01-01 RX ADMIN — PANTOPRAZOLE SODIUM 40 MG: 40 INJECTION, POWDER, FOR SOLUTION INTRAVENOUS at 10:21

## 2020-01-01 RX ADMIN — Medication 1 TABLET: at 08:58

## 2020-01-01 RX ADMIN — SODIUM CHLORIDE 1000 ML: 9 INJECTION, SOLUTION INTRAVENOUS at 16:03

## 2020-01-01 RX ADMIN — CHOLECALCIFEROL TAB 50 MCG (2000 UNIT) 2000 UNITS: 50 TAB at 10:08

## 2020-01-01 RX ADMIN — METOPROLOL TARTRATE 5 MG: 5 INJECTION INTRAVENOUS at 07:20

## 2020-01-01 RX ADMIN — SODIUM CHLORIDE, PRESERVATIVE FREE 10 ML: 5 INJECTION INTRAVENOUS at 07:53

## 2020-01-01 RX ADMIN — PANTOPRAZOLE SODIUM 40 MG: 40 INJECTION, POWDER, FOR SOLUTION INTRAVENOUS at 20:42

## 2020-01-01 RX ADMIN — MAGNESIUM CITRATE 300 ML: 1.75 LIQUID ORAL at 17:47

## 2020-01-01 RX ADMIN — SODIUM CHLORIDE, PRESERVATIVE FREE 10 ML: 5 INJECTION INTRAVENOUS at 21:50

## 2020-01-01 RX ADMIN — Medication 10 ML: at 08:15

## 2020-01-01 RX ADMIN — DONEPEZIL HYDROCHLORIDE 5 MG: 5 TABLET, FILM COATED ORAL at 17:34

## 2020-01-01 RX ADMIN — POTASSIUM CHLORIDE 20 MEQ: 20 TABLET, EXTENDED RELEASE ORAL at 16:19

## 2020-01-01 RX ADMIN — SODIUM CHLORIDE: 9 INJECTION, SOLUTION INTRAVENOUS at 15:00

## 2020-01-01 RX ADMIN — IOPAMIDOL 110 ML: 755 INJECTION, SOLUTION INTRAVENOUS at 09:22

## 2020-01-01 RX ADMIN — SODIUM CHLORIDE: 9 INJECTION, SOLUTION INTRAVENOUS at 16:40

## 2020-01-01 RX ADMIN — SODIUM CHLORIDE, PRESERVATIVE FREE 10 ML: 5 INJECTION INTRAVENOUS at 20:40

## 2020-01-01 RX ADMIN — METOPROLOL TARTRATE 25 MG: 25 TABLET, FILM COATED ORAL at 21:43

## 2020-01-01 RX ADMIN — FERROUS SULFATE TAB 325 MG (65 MG ELEMENTAL FE) 325 MG: 325 (65 FE) TAB at 09:00

## 2020-01-01 RX ADMIN — FAMOTIDINE 20 MG: 20 TABLET, FILM COATED ORAL at 11:40

## 2020-01-01 RX ADMIN — CLONAZEPAM 1 MG: 0.5 TABLET ORAL at 21:57

## 2020-01-01 RX ADMIN — MAGNESIUM CITRATE 300 ML: 1.75 LIQUID ORAL at 18:26

## 2020-01-01 RX ADMIN — SODIUM CHLORIDE, PRESERVATIVE FREE 10 ML: 5 INJECTION INTRAVENOUS at 19:24

## 2020-01-01 RX ADMIN — FERROUS SULFATE TAB 325 MG (65 MG ELEMENTAL FE) 325 MG: 325 (65 FE) TAB at 16:34

## 2020-01-01 RX ADMIN — DONEPEZIL HYDROCHLORIDE 5 MG: 5 TABLET, FILM COATED ORAL at 17:37

## 2020-01-01 RX ADMIN — CLONAZEPAM 1 MG: 0.5 TABLET ORAL at 22:07

## 2020-01-01 RX ADMIN — CHOLECALCIFEROL TAB 50 MCG (2000 UNIT) 2000 UNITS: 50 TAB at 15:07

## 2020-01-01 RX ADMIN — DONEPEZIL HYDROCHLORIDE 5 MG: 5 TABLET, FILM COATED ORAL at 16:33

## 2020-01-01 RX ADMIN — POLYETHYLENE GLYCOL 3350 17 G: 17 POWDER, FOR SOLUTION ORAL at 20:37

## 2020-01-01 RX ADMIN — FERROUS SULFATE TAB 325 MG (65 MG ELEMENTAL FE) 325 MG: 325 (65 FE) TAB at 17:37

## 2020-01-01 RX ADMIN — METOPROLOL TARTRATE 37.5 MG: 25 TABLET, FILM COATED ORAL at 09:39

## 2020-01-01 RX ADMIN — METOPROLOL TARTRATE 25 MG: 25 TABLET, FILM COATED ORAL at 17:27

## 2020-01-01 RX ADMIN — SODIUM CHLORIDE, PRESERVATIVE FREE 10 ML: 5 INJECTION INTRAVENOUS at 20:47

## 2020-01-01 RX ADMIN — SODIUM CHLORIDE, PRESERVATIVE FREE 10 ML: 5 INJECTION INTRAVENOUS at 10:53

## 2020-01-01 RX ADMIN — DONEPEZIL HYDROCHLORIDE 5 MG: 5 TABLET, FILM COATED ORAL at 16:57

## 2020-01-01 RX ADMIN — POLYETHYLENE GLYCOL 3350 17 G: 17 POWDER, FOR SOLUTION ORAL at 10:09

## 2020-01-01 RX ADMIN — SODIUM CHLORIDE: 9 INJECTION, SOLUTION INTRAVENOUS at 20:11

## 2020-01-01 RX ADMIN — METOPROLOL TARTRATE 37.5 MG: 25 TABLET, FILM COATED ORAL at 09:07

## 2020-01-01 RX ADMIN — DONEPEZIL HYDROCHLORIDE 5 MG: 5 TABLET, FILM COATED ORAL at 17:40

## 2020-01-01 RX ADMIN — CLONAZEPAM 1 MG: 0.5 TABLET ORAL at 20:42

## 2020-01-01 RX ADMIN — LUBIPROSTONE 8 MCG: 8 CAPSULE, GELATIN COATED ORAL at 02:31

## 2020-01-01 RX ADMIN — ERTAPENEM SODIUM 1000 MG: 1 INJECTION, POWDER, LYOPHILIZED, FOR SOLUTION INTRAMUSCULAR; INTRAVENOUS at 09:30

## 2020-01-01 RX ADMIN — CLONAZEPAM 1 MG: 0.5 TABLET ORAL at 19:42

## 2020-01-01 RX ADMIN — SODIUM BICARBONATE: 84 INJECTION, SOLUTION INTRAVENOUS at 02:31

## 2020-01-01 RX ADMIN — SODIUM CHLORIDE: 9 INJECTION, SOLUTION INTRAVENOUS at 16:22

## 2020-01-01 RX ADMIN — POTASSIUM CHLORIDE 40 MEQ: 20 TABLET, EXTENDED RELEASE ORAL at 14:45

## 2020-01-01 RX ADMIN — SODIUM CHLORIDE, PRESERVATIVE FREE 10 ML: 5 INJECTION INTRAVENOUS at 22:03

## 2020-01-01 RX ADMIN — POTASSIUM CHLORIDE 20 MEQ: 20 TABLET, EXTENDED RELEASE ORAL at 10:11

## 2020-01-01 RX ADMIN — IOPAMIDOL 110 ML: 755 INJECTION, SOLUTION INTRAVENOUS at 14:24

## 2020-01-01 RX ADMIN — SODIUM CHLORIDE: 9 INJECTION, SOLUTION INTRAVENOUS at 04:12

## 2020-01-01 RX ADMIN — FERROUS SULFATE TAB 325 MG (65 MG ELEMENTAL FE) 325 MG: 325 (65 FE) TAB at 17:40

## 2020-01-01 RX ADMIN — SODIUM CHLORIDE, PRESERVATIVE FREE 10 ML: 5 INJECTION INTRAVENOUS at 20:46

## 2020-01-01 RX ADMIN — SODIUM CHLORIDE 1000 ML: 9 INJECTION, SOLUTION INTRAVENOUS at 11:07

## 2020-01-01 RX ADMIN — FERROUS SULFATE TAB 325 MG (65 MG ELEMENTAL FE) 325 MG: 325 (65 FE) TAB at 16:14

## 2020-01-01 RX ADMIN — DONEPEZIL HYDROCHLORIDE 5 MG: 5 TABLET, FILM COATED ORAL at 17:46

## 2020-01-01 RX ADMIN — PROPOFOL 30 MG: 10 INJECTION, EMULSION INTRAVENOUS at 08:08

## 2020-01-01 RX ADMIN — CEFTRIAXONE 1 G: 1 INJECTION, POWDER, FOR SOLUTION INTRAMUSCULAR; INTRAVENOUS at 23:01

## 2020-01-01 RX ADMIN — Medication 10 ML: at 19:56

## 2020-01-01 RX ADMIN — Medication 1 TABLET: at 10:08

## 2020-01-01 RX ADMIN — SODIUM PHOSPHATE 1 ENEMA: 7; 19 ENEMA RECTAL at 12:11

## 2020-01-01 RX ADMIN — LUBIPROSTONE 8 MCG: 8 CAPSULE, GELATIN COATED ORAL at 09:31

## 2020-01-01 RX ADMIN — METOPROLOL TARTRATE 37.5 MG: 25 TABLET, FILM COATED ORAL at 20:45

## 2020-01-01 RX ADMIN — SODIUM CHLORIDE, PRESERVATIVE FREE 10 ML: 5 INJECTION INTRAVENOUS at 09:08

## 2020-01-01 RX ADMIN — POTASSIUM BICARBONATE 40 MEQ: 782 TABLET, EFFERVESCENT ORAL at 09:37

## 2020-01-01 RX ADMIN — POTASSIUM CHLORIDE 10 MEQ: 10 INJECTION, SOLUTION INTRAVENOUS at 13:52

## 2020-01-01 RX ADMIN — CEFTRIAXONE SODIUM 1 G: 1 INJECTION, POWDER, FOR SOLUTION INTRAMUSCULAR; INTRAVENOUS at 20:40

## 2020-01-01 RX ADMIN — LUBIPROSTONE 8 MCG: 8 CAPSULE, GELATIN COATED ORAL at 08:45

## 2020-01-01 RX ADMIN — LIDOCAINE HYDROCHLORIDE 40 MG: 20 INJECTION, SOLUTION EPIDURAL; INFILTRATION; INTRACAUDAL; PERINEURAL at 08:05

## 2020-01-01 RX ADMIN — Medication 20 MILLICURIE: at 07:24

## 2020-01-01 RX ADMIN — FERROUS SULFATE TAB 325 MG (65 MG ELEMENTAL FE) 325 MG: 325 (65 FE) TAB at 07:54

## 2020-01-01 RX ADMIN — SODIUM CHLORIDE: 9 INJECTION, SOLUTION INTRAVENOUS at 11:19

## 2020-01-01 RX ADMIN — SODIUM CHLORIDE: 9 INJECTION, SOLUTION INTRAVENOUS at 12:02

## 2020-01-01 RX ADMIN — DONEPEZIL HYDROCHLORIDE 5 MG: 5 TABLET, FILM COATED ORAL at 17:24

## 2020-01-01 RX ADMIN — METOPROLOL TARTRATE 37.5 MG: 25 TABLET, FILM COATED ORAL at 09:00

## 2020-01-01 RX ADMIN — LUBIPROSTONE 8 MCG: 8 CAPSULE, GELATIN COATED ORAL at 20:41

## 2020-01-01 RX ADMIN — ALBUTEROL SULFATE 10 MG: 2.5 SOLUTION RESPIRATORY (INHALATION) at 20:52

## 2020-01-01 RX ADMIN — ACETAMINOPHEN 650 MG: 325 TABLET ORAL at 20:36

## 2020-01-01 RX ADMIN — ERTAPENEM SODIUM 1000 MG: 1 INJECTION, POWDER, LYOPHILIZED, FOR SOLUTION INTRAMUSCULAR; INTRAVENOUS at 09:07

## 2020-01-01 RX ADMIN — SODIUM CHLORIDE 500 ML: 9 INJECTION, SOLUTION INTRAVENOUS at 08:23

## 2020-01-01 RX ADMIN — POTASSIUM CHLORIDE 40 MEQ: 20 TABLET, EXTENDED RELEASE ORAL at 16:38

## 2020-01-01 RX ADMIN — SODIUM CHLORIDE: 9 INJECTION, SOLUTION INTRAVENOUS at 11:26

## 2020-01-01 RX ADMIN — PROPOFOL 70 MG: 10 INJECTION, EMULSION INTRAVENOUS at 14:50

## 2020-01-01 RX ADMIN — PROPOFOL 200 MCG/KG/MIN: 10 INJECTION, EMULSION INTRAVENOUS at 11:52

## 2020-01-01 RX ADMIN — DIPHENHYDRAMINE HCL 25 MG: 25 TABLET ORAL at 11:13

## 2020-01-01 RX ADMIN — SODIUM CHLORIDE, POTASSIUM CHLORIDE, SODIUM LACTATE AND CALCIUM CHLORIDE: 600; 310; 30; 20 INJECTION, SOLUTION INTRAVENOUS at 08:02

## 2020-01-01 RX ADMIN — FERROUS SULFATE TAB 325 MG (65 MG ELEMENTAL FE) 325 MG: 325 (65 FE) TAB at 11:40

## 2020-01-01 RX ADMIN — FAMOTIDINE 20 MG: 20 TABLET, FILM COATED ORAL at 10:08

## 2020-01-01 RX ADMIN — FERROUS SULFATE TAB 325 MG (65 MG ELEMENTAL FE) 325 MG: 325 (65 FE) TAB at 16:19

## 2020-01-01 RX ADMIN — FERROUS SULFATE TAB 325 MG (65 MG ELEMENTAL FE) 325 MG: 325 (65 FE) TAB at 08:45

## 2020-01-01 RX ADMIN — PANTOPRAZOLE SODIUM 40 MG: 40 INJECTION, POWDER, FOR SOLUTION INTRAVENOUS at 08:27

## 2020-01-01 RX ADMIN — POTASSIUM CHLORIDE 20 MEQ: 20 TABLET, EXTENDED RELEASE ORAL at 08:50

## 2020-01-01 RX ADMIN — SODIUM CHLORIDE, PRESERVATIVE FREE 10 ML: 5 INJECTION INTRAVENOUS at 20:42

## 2020-01-01 RX ADMIN — MAGNESIUM SULFATE: 1 CRYSTAL ORAL; TOPICAL at 12:11

## 2020-01-01 RX ADMIN — HYDROCORTISONE SODIUM SUCCINATE 25 MG: 100 INJECTION, POWDER, FOR SOLUTION INTRAMUSCULAR; INTRAVENOUS at 11:14

## 2020-01-01 RX ADMIN — CALCIUM GLUCONATE 1 G: 98 INJECTION, SOLUTION INTRAVENOUS at 20:03

## 2020-01-01 RX ADMIN — Medication 10 ML: at 09:20

## 2020-01-01 RX ADMIN — POTASSIUM CHLORIDE 40 MEQ: 1500 TABLET, EXTENDED RELEASE ORAL at 09:46

## 2020-01-01 RX ADMIN — SODIUM CHLORIDE, PRESERVATIVE FREE 10 ML: 5 INJECTION INTRAVENOUS at 08:59

## 2020-01-01 RX ADMIN — BARIUM SULFATE 500 ML: 1.05 SUSPENSION ORAL; RECTAL at 15:24

## 2020-01-01 RX ADMIN — METOPROLOL TARTRATE 37.5 MG: 25 TABLET, FILM COATED ORAL at 20:36

## 2020-01-01 RX ADMIN — POTASSIUM CHLORIDE 40 MEQ: 20 TABLET, EXTENDED RELEASE ORAL at 20:40

## 2020-01-01 RX ADMIN — Medication 10 ML: at 08:27

## 2020-01-01 RX ADMIN — Medication 10 ML: at 08:23

## 2020-01-01 RX ADMIN — FERROUS SULFATE TAB 325 MG (65 MG ELEMENTAL FE) 325 MG: 325 (65 FE) TAB at 08:00

## 2020-01-01 RX ADMIN — DONEPEZIL HYDROCHLORIDE 5 MG: 5 TABLET, FILM COATED ORAL at 16:38

## 2020-01-01 RX ADMIN — FERROUS SULFATE TAB 325 MG (65 MG ELEMENTAL FE) 325 MG: 325 (65 FE) TAB at 12:02

## 2020-01-01 RX ADMIN — PANTOPRAZOLE SODIUM 40 MG: 40 INJECTION, POWDER, FOR SOLUTION INTRAVENOUS at 08:15

## 2020-01-01 RX ADMIN — LACTULOSE 10 G: 20 SOLUTION ORAL at 20:36

## 2020-01-01 RX ADMIN — SODIUM CHLORIDE: 9 INJECTION, SOLUTION INTRAVENOUS at 23:17

## 2020-01-01 RX ADMIN — FERROUS SULFATE TAB 325 MG (65 MG ELEMENTAL FE) 325 MG: 325 (65 FE) TAB at 12:11

## 2020-01-01 RX ADMIN — METOPROLOL TARTRATE 25 MG: 25 TABLET, FILM COATED ORAL at 14:17

## 2020-01-01 RX ADMIN — PANTOPRAZOLE SODIUM 40 MG: 40 INJECTION, POWDER, FOR SOLUTION INTRAVENOUS at 08:03

## 2020-01-01 RX ADMIN — CLONAZEPAM 1 MG: 0.5 TABLET ORAL at 21:43

## 2020-01-01 RX ADMIN — SODIUM CHLORIDE: 9 INJECTION, SOLUTION INTRAVENOUS at 16:51

## 2020-01-01 RX ADMIN — CEFTRIAXONE SODIUM 1 G: 1 INJECTION, POWDER, FOR SOLUTION INTRAMUSCULAR; INTRAVENOUS at 20:49

## 2020-01-01 RX ADMIN — FERROUS SULFATE TAB 325 MG (65 MG ELEMENTAL FE) 325 MG: 325 (65 FE) TAB at 16:38

## 2020-01-01 RX ADMIN — SODIUM CHLORIDE: 9 INJECTION, SOLUTION INTRAVENOUS at 14:52

## 2020-01-01 ASSESSMENT — PAIN SCALES - PAIN ASSESSMENT IN ADVANCED DEMENTIA (PAINAD)
TOTALSCORE: 0
CONSOLABILITY: 0
BODYLANGUAGE: 0
TOTALSCORE: 0
NEGVOCALIZATION: 0
BODYLANGUAGE: 0
CONSOLABILITY: 0
BREATHING: 0
NEGVOCALIZATION: 0
TOTALSCORE: 0
CONSOLABILITY: 0
TOTALSCORE: 0
TOTALSCORE: 0
FACIALEXPRESSION: 0
TOTALSCORE: 0
NEGVOCALIZATION: 0
NEGVOCALIZATION: 0
BODYLANGUAGE: 1
BODYLANGUAGE: 0
FACIALEXPRESSION: 0
BODYLANGUAGE: 0
FACIALEXPRESSION: 0
TOTALSCORE: 0
NEGVOCALIZATION: 0
NEGVOCALIZATION: 0
FACIALEXPRESSION: 0
BREATHING: 0
BREATHING: 0
CONSOLABILITY: 0
TOTALSCORE: 0
TOTALSCORE: 2
BREATHING: 0
TOTALSCORE: 0
TOTALSCORE: 0
BODYLANGUAGE: 0
FACIALEXPRESSION: 0
BREATHING: 0
FACIALEXPRESSION: 0
BREATHING: 0
TOTALSCORE: 0
BODYLANGUAGE: 0
CONSOLABILITY: 0
BODYLANGUAGE: 0
BREATHING: 0
NEGVOCALIZATION: 0
CONSOLABILITY: 0
CONSOLABILITY: 0
BODYLANGUAGE: 0
BREATHING: 0
TOTALSCORE: 0
FACIALEXPRESSION: 0
BODYLANGUAGE: 0
CONSOLABILITY: 0
NEGVOCALIZATION: 0
NEGVOCALIZATION: 0
BREATHING: 0
NEGVOCALIZATION: 0
FACIALEXPRESSION: 0
BREATHING: 0
BREATHING: 0
CONSOLABILITY: 0
TOTALSCORE: 0
FACIALEXPRESSION: 0
FACIALEXPRESSION: 0
BREATHING: 0
NEGVOCALIZATION: 0
BODYLANGUAGE: 0
BREATHING: 0
CONSOLABILITY: 1
FACIALEXPRESSION: 0
BREATHING: 0
CONSOLABILITY: 0
BODYLANGUAGE: 0
CONSOLABILITY: 0
FACIALEXPRESSION: 0
NEGVOCALIZATION: 0
BODYLANGUAGE: 0
FACIALEXPRESSION: 0
BREATHING: 0
NEGVOCALIZATION: 0
CONSOLABILITY: 0
BODYLANGUAGE: 0
BODYLANGUAGE: 0
NEGVOCALIZATION: 0
CONSOLABILITY: 0
BREATHING: 0
FACIALEXPRESSION: 0
CONSOLABILITY: 0
CONSOLABILITY: 0
BREATHING: 0
BODYLANGUAGE: 0
TOTALSCORE: 0
NEGVOCALIZATION: 0
BREATHING: 0
BODYLANGUAGE: 0
NEGVOCALIZATION: 0
CONSOLABILITY: 0
NEGVOCALIZATION: 0
FACIALEXPRESSION: 0
TOTALSCORE: 0
FACIALEXPRESSION: 0
NEGVOCALIZATION: 0
BODYLANGUAGE: 0
CONSOLABILITY: 0

## 2020-01-01 ASSESSMENT — PAIN DESCRIPTION - PAIN TYPE
TYPE: CHRONIC PAIN
TYPE: CHRONIC PAIN

## 2020-01-01 ASSESSMENT — PAIN DESCRIPTION - DESCRIPTORS: DESCRIPTORS: ACHING;DISCOMFORT;SORE

## 2020-01-01 ASSESSMENT — PAIN SCALES - GENERAL
PAINLEVEL_OUTOF10: 0
PAINLEVEL_OUTOF10: 3
PAINLEVEL_OUTOF10: 0
PAINLEVEL_OUTOF10: 0
PAINLEVEL_OUTOF10: 2
PAINLEVEL_OUTOF10: 0
PAINLEVEL_OUTOF10: 3
PAINLEVEL_OUTOF10: 0

## 2020-01-01 ASSESSMENT — PULMONARY FUNCTION TESTS
PIF_VALUE: 1
PIF_VALUE: 1
PIF_VALUE: 0
PIF_VALUE: 1
PIF_VALUE: 0
PIF_VALUE: 1
PIF_VALUE: 0
PIF_VALUE: 1
PIF_VALUE: 0
PIF_VALUE: 1
PIF_VALUE: 0
PIF_VALUE: 1
PIF_VALUE: 0
PIF_VALUE: 1
PIF_VALUE: 1
PIF_VALUE: 0
PIF_VALUE: 1
PIF_VALUE: 0

## 2020-01-01 ASSESSMENT — ENCOUNTER SYMPTOMS
DIARRHEA: 0
ABDOMINAL DISTENTION: 0
EYE PAIN: 0
SINUS PRESSURE: 0
VOMITING: 0
EYE DISCHARGE: 0
COUGH: 0
VOMITING: 0
BLOOD IN STOOL: 0
COUGH: 0
VOMITING: 0
EYE PAIN: 0
ABDOMINAL PAIN: 0
DIARRHEA: 0
PHOTOPHOBIA: 0
CONSTIPATION: 1
WHEEZING: 0
SINUS PRESSURE: 0
BACK PAIN: 0
SORE THROAT: 0
BACK PAIN: 1
SHORTNESS OF BREATH: 0
SINUS PAIN: 0
WHEEZING: 0
SORE THROAT: 0
COLOR CHANGE: 1
EYE REDNESS: 0
BACK PAIN: 0
CHEST TIGHTNESS: 0
NAUSEA: 0
SHORTNESS OF BREATH: 0
NAUSEA: 0
VOICE CHANGE: 0
EYE REDNESS: 0
NAUSEA: 0
EYE REDNESS: 0
DIARRHEA: 0
TROUBLE SWALLOWING: 0
SINUS PRESSURE: 0
EYE PAIN: 0
EYE DISCHARGE: 0
SHORTNESS OF BREATH: 0
ABDOMINAL DISTENTION: 0
COUGH: 0

## 2020-01-01 ASSESSMENT — PAIN DESCRIPTION - ONSET: ONSET: GRADUAL

## 2020-01-01 ASSESSMENT — PAIN DESCRIPTION - LOCATION
LOCATION: BACK
LOCATION: BACK

## 2020-01-01 ASSESSMENT — PAIN - FUNCTIONAL ASSESSMENT
PAIN_FUNCTIONAL_ASSESSMENT: 0-10
PAIN_FUNCTIONAL_ASSESSMENT: PREVENTS OR INTERFERES SOME ACTIVE ACTIVITIES AND ADLS

## 2020-01-01 ASSESSMENT — PAIN DESCRIPTION - FREQUENCY: FREQUENCY: INTERMITTENT

## 2020-01-01 ASSESSMENT — PAIN DESCRIPTION - PROGRESSION: CLINICAL_PROGRESSION: GRADUALLY WORSENING

## 2020-01-01 ASSESSMENT — PAIN DESCRIPTION - ORIENTATION: ORIENTATION: LOWER

## 2020-01-17 PROBLEM — D64.9 ANEMIA: Status: ACTIVE | Noted: 2020-01-01

## 2020-01-17 PROBLEM — E78.5 HYPERLIPIDEMIA: Chronic | Status: ACTIVE | Noted: 2020-01-01

## 2020-01-17 PROBLEM — K21.9 GERD (GASTROESOPHAGEAL REFLUX DISEASE): Status: ACTIVE | Noted: 2020-01-01

## 2020-01-17 PROBLEM — M54.9 BACK PAIN: Chronic | Status: ACTIVE | Noted: 2020-01-01

## 2020-01-17 NOTE — H&P
Mamadou Raines is a 77 y.o. female  Had a fracture of left wrist 2 month ago,  Was taking NSAIDS prn, and 2 weeks ago was c/o of back pain with no radiation and was prescribed med ute,, 2 days ago she c/o of fatigue and  had lab work done and HGB was 6.7  And iron 67,  She denied any change of bowel habit or color,  Also no hematuria, epistaxis, she was referred to DR JORDAN DESIR to evaluate for GI as a source of ? Bleed. Her HG dropped to 5.5 and she is admitted to the hospital for further testing,  She received 1 unit of PRBCs, and is getting the 2 dose now. She has hs os insomnia on klonopin  And hyperlipidemia on zocor    Past Medical History:   Diagnosis Date    Hyperlipidemia        Past Surgical History:   Procedure Laterality Date    FOREARM SURGERY Left 9/29/2019    RADIUS OPEN REDUCTION INTERNAL FIXATION performed by Edita Santillan DO at [facility] OR       Family History   Problem Relation Age of Onset    Alzheimer's Disease Mother     High Blood Pressure Brother        Prior to Admission medications    Medication Sig Start Date End Date Taking? Authorizing Provider   Cholecalciferol (VITAMIN D3) 50 MCG (2000 UT) CAPS Take 4,000 Units by mouth every morning   Yes Historical Provider, MD   acetaminophen (TYLENOL) 500 MG tablet Take 1,000 mg by mouth 2 times daily   Yes Historical Provider, MD   omeprazole (PRILOSEC) 20 MG delayed release capsule Take 40 mg by mouth every evening   Yes Historical Provider, MD   ferrous sulfate 325 (65 Fe) MG tablet Take 325 mg by mouth 3 times daily (with meals)   Yes Historical Provider, MD   simvastatin (ZOCOR) 20 MG tablet Take 20 mg by mouth every morning   Yes Historical Provider, MD   clonazePAM (KLONOPIN) 2 MG tablet Take 1 mg by mouth nightly.   12/29/19  Yes Historical Provider, MD   donepezil (ARICEPT) 5 MG tablet Take 5 mg by mouth nightly   Yes Historical Provider, MD        Allergies: Codeine    Social History     Tobacco Use    Smoking status: Never Smoker    Smokeless tobacco: Never Used   Substance Use Topics    Alcohol use: Yes     Comment: social only        Review of Systems:  Respiratory: negative for cough and hemoptysis  Cardiovascular: negative for chest pain    Gastrointestinal: negative for abdominal pain, diarrhea, nausea and vomiting  Genitourinary:negative for dysuria and hematuria  Derm: negative for rash and skin lesion(s)  Neurological: negative for seizures and tremors  Endocrine: negative for diabetic symptoms including polydipsia and polyuria    OBJECTIVE    Vitals:    01/17/20 1730   BP: (!) 119/59   Pulse: 88   Resp: 16   Temp: 98 °F (36.7 °C)   SpO2: 98%       Physical Exam:  General Appearance:    Alert, cooperative,  pale   Head:    Normocephalic, without obvious abnormality, atraumatic   Eyes:    PERRL, conjunctiva/corneas clear, EOM's intact, fundi     benign, both eyes        Ears:    Normal TM's and external ear canals, both ears   Nose:   Nares normal, septum midline, mucosa normal, no drainage    or sinus tenderness   Throat:   Lips, mucosa, and tongue normal; teeth and gums normal   Neck:   Supple, symmetrical, trachea midline, no adenopathy;        thyroid:  No enlargement/tenderness/nodules; no carotid    bruit or JVD   Back:     Symmetric, no curvature, ROM normal, no CVA tenderness   Lungs:     Clear to auscultation bilaterally, respirations unlabored   Chest wall:    No tenderness or deformity   Heart:    Regular rate and rhythm, S1 and S2 normal, no murmur, rub   or gallop   Abdomen:     Soft, non-tender, bowel sounds active all four quadrants,     no masses, no organomegaly            Extremities:   Extremities normal, atraumatic, no cyanosis or edema   Pulses:   2+ and symmetric all extremities   Skin:   Skin color, texture, turgor normal, no rashes or lesions   Lymph nodes:   Cervical, supraclavicular, and axillary nodes normal   Neurologic:   CNII-XII intact.  Normal strength, sensation and reflexes       throughout Bladder is partially distended. There is diverticulosis of  colon with constipation. The appendix is not identified. There is no  retroperitoneal hematoma.     Impression:       There is no acute inflammation or retroperitoneal hematoma. There is  diverticulosis of the constipation.               Assessment and Plan:    Patient Active Problem List   Diagnosis    Fracture, Colles, left, closed    Anemia    Hyperlipidemia    Back pain    GERD (gastroesophageal reflux disease)     Admit,  IV fluids, cnosult to G Surgery, IV Protonix, monitor H/H.  Full liquid diet, continue klonopin

## 2020-01-17 NOTE — PROGRESS NOTES
OP: SURGEON: Dr. Lola Calderon DO  DATE OF PROCEDURE: 9/29/2019  PROCEDURE:1.  Left carpal tunnel release. 2.  Left distal radius fracture open reduction and internal fixation. 3.  Closed treatment of left distal ulna fracture. Subjective:  Mary Braydon is approximately 3 months follow-up from the above surgery. Patient is PWB on that extremity. Patient is right hand dominant. Patient states that she finished outpatient therapy last week. Patient denies pain. States that the numbness and tingling to the thumb, index and middle finger has persisted. Denies Calf pain, denies fevers of chills. Patient no longer is wearing velcro wrist brace. Patient denies any other orthopedic complaints. Review of Systems -    General ROS: negative for - chills, fatigue, fever or night sweats  Respiratory ROS: no cough, shortness of breath, or wheezing  Cardiovascular ROS: no chest pain or dyspnea on exertion  Gastrointestinal ROS: no abdominal pain, nausea, vomiting, diarrhea, constipation,or black or bloody stools  Genitourinary: no hematuria, dysuria, or incontinence   Musculoskeletal ROS: negative for -back or neck pain or stiffness, also see HPI  Neurological ROS: no TIA or stroke symptoms       Objective:    General: Alert and oriented X 3, normocephalic atraumatic, external ears and eye normal, sclera clear, no acute distress, respirations easy and unlabored with no audible wheezes, skin warm and dry, speech and dress appropriate for noted age, affect euthymic. Extremity:  Left Upper Extremity  Skin is clean dry and intact  Mild edema noted  Radial pulse palpable, fingers warm with BCR  Flex/extension intact to wrist, thumb and fingers  AROM of the wrist demonstrates flexion to 35 degrees, extension to 40 degrees, radial and ulnar deviation within normal limits.  Supination and pronation equal to contralateral side  Finger opposition intact  Finger adduction/abduction intact  Finger crossover intact  Subjectively states sensation intact to radial/ulnar distribution. Diminished sensation to the median nerve distribution. Thenar & intrinsic atrophy noted   Incision well healed with no redness, drainage or warmth. Mild adhesions noted at her carpal tunnel release incision. /60 (Site: Right Upper Arm)   Pulse 84   Resp 18   Ht 5' 4\" (1.626 m)   Wt 124 lb (56.2 kg)   BMI 21.28 kg/m²     XR:   3V of the left wrist demonstrates stable appearing hardware to the distal radius, fracture without change in alignment and interval healing is appreciated. No evidence of hardware failure or lucency. No other acute osseous abnormality identified    Assessment:   Diagnosis Orders   1. Closed Colles' fracture of left radius with routine healing, subsequent encounter  EMG   2. Carpal tunnel syndrome of left wrist  EMG       Plan:  Progressively increase weightbearing to the left hand/wrist as you tolerate  Daily scar tissue massage to the hand and wrist  Aggressive range of motion   EMG/NCT ordered due to persistent numbness and tingling to the left hand  Follow up 2-3 months  Patient was seen and evaluated with Dr. Alfonzo Haider today    Electronically signed by Augustina Rodrigez PA-C on 1/17/2020 at 6:56 AM  Note: This report was completed using [employer] voiced recognition software. Every effort has been made to ensure accuracy; however, inadvertent computerized transcription errors may be present.

## 2020-01-17 NOTE — ED PROVIDER NOTES
round, and reactive to light. Comments: Pale conjunctiva   Neck:      Musculoskeletal: Normal range of motion and neck supple. Cardiovascular:      Rate and Rhythm: Regular rhythm. Tachycardia present. Heart sounds: Normal heart sounds. No murmur. Pulmonary:      Effort: Pulmonary effort is normal. No respiratory distress. Breath sounds: Normal breath sounds. No wheezing or rales. Abdominal:      General: Bowel sounds are normal.      Palpations: Abdomen is soft. Tenderness: There is no tenderness. There is no guarding or rebound. Genitourinary:     Rectum: Guaiac result negative. Musculoskeletal:      Comments: There are multiple ecchymotic lesions noted along the lumbar region just superior to the sacrum and iliac crests bilaterally there is mild tenderness to palpation diffusely throughout the lower back   Skin:     General: Skin is warm and dry. Coloration: Skin is pale. Neurological:      Mental Status: She is alert and oriented to person, place, and time. Cranial Nerves: No cranial nerve deficit. Coordination: Coordination normal.          Procedures     MDM  Number of Diagnoses or Management Options  Anemia, unspecified type:   Diagnosis management comments: 81-year-old female history of hyperlipidemia presenting to ED at request of PCP for anemia with point-of-care hemoglobin approximately 4.9 today in the office. Patient recently had hemoglobin check on the 14th of 6.1 and was started on iron. Patient denies any history of GI bleeds or anemia in the past.  She has noted over the last 3 weeks moderate lumbar back pain with bruising. Denies any trauma. She is not on blood thinners. She does note increased confusion and lightheadedness, no other symptoms at this time. Hemoglobin to be 5.5. She was initiated on transfusion of 2 units of packed red blood cells.   Case was discussed with family physician who requested admission for further evaluation as well as Date: 2020  Patient MRN:  [id number] : 1950 Age: 77 years Gender: Female Order Date:  2020 10:27 AM EXAM: XR WRIST LEFT (MIN 3 VIEWS) NUMBER OF IMAGES:  3 views INDICATION: S52.532D Closed Colles' fracture of left radius with routine healing, subsequent encounter post op COMPARISON: 2019 Redemonstration of fixation plate and screws to the left distal radius. . Alignment remains unchanged. No foreign body is identified. Fracture lines are less conspicuous. Diffuse osteopenia. .     Osteopenia. Stable fixation hardware of the left distal radius without significant change in alignment. The exam has been dictated and signed by Lucile Gowers. ONEIL Wheeler-SKYLAR and Felicity Aguilera MD, reviewed and concurred with these findings. Ct Abdomen Pelvis W Iv Contrast Additional Contrast? None    Result Date: 2020  Patient MRN:  [id number] : 1950 Age: 77 years Gender: Female Order Date:  2020 12:15 PM EXAM: CT ABDOMEN PELVIS W IV CONTRAST number of images 349 Contrast. Isovue-370, 110 mL intravenously. Technique: Low-dose CT  acquisition technique included one of following options; 1 . Automated exposure control, 2. Adjustment of MA and or KV according to patient's size or 3. Use of iterative reconstruction. INDICATION:  eval for retroperitoneal bleed eval for retroperitoneal bleed COMPARISON: None FINDINGS: The lung bases are normal. The liver, gallbladder, spleen, pancreas, the adrenals and the kidneys are normal. There is calcification of aorta. Degenerative changes are identified in the lumbar spine with 30% compression deformity of superior endplates of L5 and L4. Pelvis. Bladder is partially distended. There is diverticulosis of colon with constipation. The appendix is not identified. There is no retroperitoneal hematoma. There is no acute inflammation or retroperitoneal hematoma.  There is diverticulosis of the constipation.             ------------------------- NURSING NOTES AND VITALS REVIEWED ---------------------------  Date / Time Roomed:  1/17/2020 11:47 AM  ED Bed Assignment:  14/14    The nursing notes within the ED encounter and vital signs as below have been reviewed. Patient Vitals for the past 24 hrs:   BP Temp Temp src Pulse Resp SpO2 Height Weight   01/17/20 1334 (!) 132/54 -- -- 92 16 96 % -- --   01/17/20 1153 (!) 144/62 97.6 °F (36.4 °C) Oral 100 16 100 % 5' 4\" (1.626 m) 124 lb (56.2 kg)       Oxygen Saturation Interpretation: Normal    ------------------------------------------ PROGRESS NOTES ------------------------------------------  ED Course as of Jan 17 1502   Fri Jan 17, 2020   1220 Discussed risks versus benefits of blood transfusion. Discussed risks of infection, allergic reaction, and pulmonary edema. Discussed that I believe benefits outweigh risks. Patient and family demonstrate understanding and agreeable to blood transfusion. [JA]   1255 EKG: This EKG is signed and interpreted by me. Rate: 96  Rhythm: Sinus  Interpretation: NSR  Comparison: no previous EKG available      [JA]   1400 Spoke with Dr. Yamila Mac. Accepted the patient for admission. Requested we consult Dr. Emelina Villeda with general surgery. [JA]   [facility] Spoke with Dr. Emelina Villeda. Asked that we call the surgery resident. [JA]      ED Course User Index  [JA] Elizabeth Bradford MD           Counseling:  I have spoken with the patient and discussed todays results, in addition to providing specific details for the plan of care and counseling regarding the diagnosis and prognosis. Their questions are answered at this time and they are agreeable with the plan of admission.    --------------------------------- ADDITIONAL PROVIDER NOTES ---------------------------------  Consultations:  Spoke with Dr. Fish Barber. Discussed case. They will admit the patient. Spoke with Dr. Emelina Villeda (Surgery). Discussed case. They will provide consultation.       This patient's ED course

## 2020-01-18 NOTE — ANESTHESIA PRE PROCEDURE
Department of Anesthesiology  Preprocedure Note       Name:  Joyce Mar   Age:  77 y.o.  :  1950                                          MRN:  [id number]         Date:  2020      Surgeon: Marlene Gunn    Procedure: EGD    Medications prior to admission:   Prior to Admission medications    Medication Sig Start Date End Date Taking? Authorizing Provider   Cholecalciferol (VITAMIN D3) 50 MCG ( UT) CAPS Take 4,000 Units by mouth every morning   Yes Historical Provider, MD   acetaminophen (TYLENOL) 500 MG tablet Take 1,000 mg by mouth 2 times daily   Yes Historical Provider, MD   omeprazole (PRILOSEC) 20 MG delayed release capsule Take 40 mg by mouth every evening   Yes Historical Provider, MD   ferrous sulfate 325 (65 Fe) MG tablet Take 325 mg by mouth 3 times daily (with meals)   Yes Historical Provider, MD   simvastatin (ZOCOR) 20 MG tablet Take 20 mg by mouth every morning   Yes Historical Provider, MD   clonazePAM (KLONOPIN) 2 MG tablet Take 1 mg by mouth nightly.   19  Yes Historical Provider, MD   donepezil (ARICEPT) 5 MG tablet Take 5 mg by mouth nightly   Yes Historical Provider, MD       Current medications:    Current Facility-Administered Medications   Medication Dose Route Frequency Provider Last Rate Last Dose    fleet rectal enema 1 enema  1 enema Rectal Once PRN Fernandez Sebastian MD        0.9 % sodium chloride infusion   Intravenous Continuous Fernandez Sebastian MD 75 mL/hr at 20 2317      magnesium hydroxide (MILK OF MAGNESIA) 400 MG/5ML suspension 30 mL  30 mL Oral Daily PRN Maged I Awadalla, MD        ondansetron (ZOFRAN) injection 4 mg  4 mg Intravenous Q6H PRN Maged I Awadalla, MD        acetaminophen (TYLENOL) tablet 650 mg  650 mg Oral Q4H PRN Maged I Awadalla, MD        clonazePAM (KLONOPIN) tablet 1 mg  1 mg Oral Nightly Maged I Awadalla, MD   1 mg at 207    pantoprazole (PROTONIX) injection 40 mg  40 mg Intravenous Daily Maged I Awadalla, MD   40 mg at including anesthesia, drug and allergy history. H&P reviewed. No interval changes to history or physical examination (unless noted above). NPO status confirmed. Anesthetic plan, risks, benefits, alternatives discussed with patient. Patient verbalized an understanding and agrees to proceed.      Clif Seaman MD  Anesthesiologist

## 2020-01-19 NOTE — ANESTHESIA PRE PROCEDURE
Department of Anesthesiology  Preprocedure Note       Name:  Elizabeth Solano   Age:  77 y.o.  :  1950                                          MRN:  [id number]         Date:  2020      Surgeon: Carmina Campbell    Procedure: colonoscopy    Medications prior to admission:   Prior to Admission medications    Medication Sig Start Date End Date Taking? Authorizing Provider   Cholecalciferol (VITAMIN D3) 50 MCG ( UT) CAPS Take 4,000 Units by mouth every morning    Historical Provider, MD   acetaminophen (TYLENOL) 500 MG tablet Take 1,000 mg by mouth 2 times daily    Historical Provider, MD   omeprazole (PRILOSEC) 20 MG delayed release capsule Take 40 mg by mouth every evening    Historical Provider, MD   ferrous sulfate 325 (65 Fe) MG tablet Take 325 mg by mouth 3 times daily (with meals)    Historical Provider, MD   simvastatin (ZOCOR) 20 MG tablet Take 20 mg by mouth every morning    Historical Provider, MD   clonazePAM (KLONOPIN) 2 MG tablet Take 1 mg by mouth nightly. 19   Historical Provider, MD   donepezil (ARICEPT) 5 MG tablet Take 5 mg by mouth nightly    Historical Provider, MD       Current medications:    No current facility-administered medications for this visit. No current outpatient medications on file.      Facility-Administered Medications Ordered in Other Visits   Medication Dose Route Frequency Provider Last Rate Last Dose    magnesium citrate solution 300 mL  300 mL Oral Once Lyndee Pott, DO        magnesium citrate solution 300 mL  300 mL Oral Once Lyndee Pott, DO        0.9 % sodium chloride infusion   Intravenous Continuous Fernandez Weems MD 75 mL/hr at 20 0021      magnesium hydroxide (MILK OF MAGNESIA) 400 MG/5ML suspension 30 mL  30 mL Oral Daily PRN Fernandez Weems MD        ondansetron (ZOFRAN) injection 4 mg  4 mg Intravenous Q6H PRN Maged I Awadalla, MD        acetaminophen (TYLENOL) tablet 650 mg  650 mg Oral Q4H PRN Devorah Teresa MD  clonazePAM (KLONOPIN) tablet 1 mg  1 mg Oral Nightly Maged I Awadalla, MD   1 mg at 01/18/20 2041    pantoprazole (PROTONIX) injection 40 mg  40 mg Intravenous Daily Maged I Awadalla, MD   40 mg at 01/19/20 0856    And    sodium chloride (PF) 0.9 % injection 10 mL  10 mL Intravenous Daily Calvin Arnett MD   10 mL at 01/19/20 0857       Allergies: Allergies   Allergen Reactions    Codeine        Problem List:    Patient Active Problem List   Diagnosis Code    Fracture, Colles, left, closed S52.532A    Anemia D64.9    Hyperlipidemia E78.5    Back pain M54.9    GERD (gastroesophageal reflux disease) K21.9       Past Medical History:        Diagnosis Date    Hyperlipidemia        Past Surgical History:        Procedure Laterality Date    FOREARM SURGERY Left 9/29/2019    RADIUS OPEN REDUCTION INTERNAL FIXATION performed by Mahsa Butterfield DO at [address] [employer] History:    Social History     Tobacco Use    Smoking status: Never Smoker    Smokeless tobacco: Never Used   Substance Use Topics    Alcohol use: Yes     Comment: social only                                Counseling given: Not Answered      Vital Signs (Current): There were no vitals filed for this visit.                                            BP Readings from Last 3 Encounters:   01/19/20 (!) 142/65   01/19/20 121/65   01/16/20 111/60       NPO Status:                                                                                 BMI:   Wt Readings from Last 3 Encounters:   01/19/20 120 lb (54.4 kg)   01/16/20 124 lb (56.2 kg)   10/28/19 124 lb (56.2 kg)     There is no height or weight on file to calculate BMI.    CBC:   Lab Results   Component Value Date    WBC 5.6 01/17/2020    RBC 1.68 01/17/2020    HGB 8.1 01/19/2020    HCT 26.1 01/19/2020    .6 01/17/2020    RDW 23.4 01/17/2020     01/17/2020       CMP:   Lab Results   Component Value Date     01/17/2020    K 4.5 01/17/2020     01/17/2020 CO2 23 01/17/2020    BUN 12 01/17/2020    CREATININE 0.7 01/17/2020    GFRAA >60 01/17/2020    LABGLOM >60 01/17/2020    GLUCOSE 115 01/17/2020    GLUCOSE 91 06/07/2011    PROT 6.9 01/17/2020    CALCIUM 9.0 01/17/2020    BILITOT 1.8 01/17/2020    ALKPHOS 62 01/17/2020    AST 31 01/17/2020    ALT 13 01/17/2020       POC Tests: No results for input(s): POCGLU, POCNA, POCK, POCCL, POCBUN, POCHEMO, POCHCT in the last 72 hours. Coags:   Lab Results   Component Value Date    PROTIME 11.6 01/17/2020    INR 1.0 01/17/2020    APTT 25.2 01/17/2020       HCG (If Applicable): No results found for: PREGTESTUR, PREGSERUM, HCG, HCGQUANT     ABGs: No results found for: PHART, PO2ART, YNY6ZVB, SZG4MRE, BEART, T7SRTZUN     Type & Screen (If Applicable):  No results found for: LABABO, [address]    Anesthesia Evaluation  Patient summary reviewed no history of anesthetic complications:   Airway: Mallampati: III  TM distance: <3 FB   Neck ROM: full  Mouth opening: > = 3 FB Dental:          Pulmonary:Negative Pulmonary ROS breath sounds clear to auscultation                             Cardiovascular:Negative CV ROS  Exercise tolerance: good (>4 METS),       Orthopnea: .me. ECG reviewed  Rhythm: regular  Rate: normal                    Neuro/Psych:                ROS comment: Chronic back pain. GI/Hepatic/Renal:             Endo/Other:    (+) blood dyscrasia: anemia:., .          Pt had no PAT visit       Abdominal:           Vascular: negative vascular ROS. Anesthesia Plan      MAC     ASA 3       Induction: intravenous. Anesthetic plan and risks discussed with patient. Patient to be re-evaluated by DOS Anesthesiologist      Bishop Paula MD   1/19/2020    ------DOS anesthesiologist addendum---------  Patient seen and evaluated. Risks and benefits of MAC anesthetic discussed with patient. All patient's questions were answered to her satisfaction.   Patient consents to and

## 2020-01-19 NOTE — ANESTHESIA POSTPROCEDURE EVALUATION
Department of Anesthesiology  Postprocedure Note    Patient: Roscoe Nielson  MRN: [id number]  YOB: 1950  Date of evaluation: 1/19/2020  Time:  8:51 AM     Procedure Summary     Date:  01/19/20 Room / Location:  [facility] 01 / 106 [facility]    Anesthesia Start:  6936 Anesthesia Stop:  7658    Procedures:       EGD ESOPHAGOGASTRODUODENOSCOPY (N/A Esophagus)      EGD Diagnosis:  (anemia)    Surgeon:  Olivia Hu DO Responsible Provider:  Roma Stuart MD    Anesthesia Type:  MAC ASA Status:  3          Anesthesia Type: MAC    Katlin Phase I:      Katlin Phase II:      Last vitals: Reviewed and per EMR flowsheets.        Anesthesia Post Evaluation    Patient location during evaluation: PACU  Patient participation: complete - patient participated  Level of consciousness: awake and alert  Pain score: 0  Airway patency: patent  Nausea & Vomiting: no vomiting and no nausea  Complications: no  Cardiovascular status: hemodynamically stable  Respiratory status: spontaneous ventilation  Hydration status: stable

## 2020-01-19 NOTE — PROGRESS NOTES
Subjective    Patient seen and examined. Patient had EGD ,  Results are pending    Objective:  Vitals:    01/19/20 1430   BP: 132/64   Pulse: 84   Resp: 16   Temp: 97.8 °F (36.6 °C)   SpO2: 98%      General Appearance:    Alert, cooperative, no distress, appears stated age   Head:    Normocephalic, without obvious abnormality, atraumatic   Eyes:    PERRL, conjunctiva/corneas clear, EOM's intact, fundi     benign, both eyes        Ears:    Normal TM's and external ear canals, both ears   Nose:   Nares normal, septum midline, mucosa normal, no drainage    or sinus tenderness   Throat:   Lips, mucosa, and tongue normal; teeth and gums normal   Neck:   Supple, symmetrical, trachea midline, no adenopathy;        thyroid:  No enlargement/tenderness/nodules; no carotid    bruit or JVD   Back:     Symmetric, no curvature, ROM normal, no CVA tenderness   Lungs:     Clear to auscultation bilaterally, respirations unlabored   Chest wall:    No tenderness or deformity   Heart:    Regular rate and rhythm, S1 and S2 normal, no murmur, rub   or gallop   Abdomen:     Soft, non-tender, bowel sounds active all four quadrants,     no masses, no organomegaly            Extremities:   Extremities normal, atraumatic, no cyanosis or edema   Pulses:   2+ and symmetric all extremities   Skin:   Skin color, texture, turgor normal, no rashes or lesions   Lymph nodes:   Cervical, supraclavicular, and axillary nodes normal   Neurologic:   CNII-XII intact.  Normal strength, sensation and reflexes       throughout             magnesium citrate  300 mL Oral Once    magnesium citrate  300 mL Oral Once    clonazePAM  1 mg Oral Nightly    pantoprazole  40 mg Intravenous Daily    And    sodium chloride (PF)  10 mL Intravenous Daily           Labs:    Hemoglobin/Hematocrit:    Lab Results   Component Value Date    HGB 8.1 01/19/2020    HCT 26.1 01/19/2020        Imaging:      Assessment and Plan:    Patient Active Problem List   Diagnosis   

## 2020-01-20 NOTE — ANESTHESIA POSTPROCEDURE EVALUATION
Department of Anesthesiology  Postprocedure Note    Patient: Sandie Maguire  MRN: [id number]  YOB: 1950  Date of evaluation: 1/20/2020  Time:  1:17 PM     Procedure Summary     Date:  01/20/20 Room / Location:  [address] / [facility]    Anesthesia Start:  1152 Anesthesia Stop:  1216    Procedures:       COLONOSCOPY DIAGNOSTIC (N/A )      anesthesia pre-op for colonoscopy Diagnosis:  (/)    Surgeon:  Dutch Juarez MD Responsible Provider:  Barb Regalado MD    Anesthesia Type:  MAC ASA Status:  3          Anesthesia Type: MAC    Katlin Phase I: Katlin Score: 10    Katlin Phase II: Katlin Score: 10    Last vitals: Reviewed and per EMR flowsheets.        Anesthesia Post Evaluation    Patient location during evaluation: PACU  Patient participation: complete - patient participated  Level of consciousness: awake and alert  Airway patency: patent  Nausea & Vomiting: no nausea and no vomiting  Complications: no  Cardiovascular status: hemodynamically stable  Respiratory status: acceptable  Hydration status: euvolemic

## 2020-01-20 NOTE — PROGRESS NOTES
Patient seen and examined. No issues overnight. Received mag citrate for bowel prep  For colonoscopy today.      Electronically signed by Akhil Walker DO on 1/20/2020 at 7:06 AM

## 2020-01-21 NOTE — PROGRESS NOTES
1950  Age:  69 years  Gender: Female  Order Date: 1/20/2020 12:45 PM  Exam: FL BARIUM ENEMA  Number of Images: 37 views  Indication:   incomplete colonoscopy, r/o obstruction   incomplete colonoscopy, r/o obstruction   Comparison: None. FLUORO TIME : 1.6 minutes  DOSE AREA PRODUCT: 39.047 (uGym2)     Findings:   The preprocedural fluoroscopic  image demonstrates a nonspecific  bowel gas pattern and no contrast throughout the colon. An enema air tip was inserted in the rectum and the retention cuff was  insufflated. Under fluoroscopic visualization, contrast was slowly instilled into  the colon. There was adequate distention of the rectosigmoid and distal  descending colon with no evidence of obstruction or stricture in the  area of concern. Numerous diverticuli are seen in the descending and  sigmoid colon. No large colonic polyp was identified. A postevacuation image shows no evidence of a polyp in the transverse  colon or splenic flexure. The ascending colon and hepatic flexure remain opacified with  contrast, limiting evaluation for smaller polypoid lesions.     Impression:       Extensive descending and sigmoid colonic diverticulosis. No evidence of colonic stricture or large polyp. Small polyps on the order of approximately 1 cm would be difficult to  exclude on the provided single-contrast views. This examination was performed and dictated by Nikolay Benedict PA-C with  indirect supervision by Evangelist Higgins MD, who has reviewed the provided  imaging and has revised the report as necessary.        Assessment and Plan:    Patient Active Problem List   Diagnosis    Fracture, Colles, left, closed    Anemia    Hyperlipidemia    Back pain    GERD (gastroesophageal reflux disease)       Anemia due to chronic blood loss    IV fluids  Stopped,  Regular diet started,  Will evaluate in am.

## 2020-01-22 NOTE — PLAN OF CARE
Care plans initiated.
Problem: Falls - Risk of:  Goal: Will remain free from falls  Description  Will remain free from falls  1/20/2020 0322 by Coleen Manning RN  Outcome: Met This Shift  1/19/2020 1702 by Anita Tovar RN  Outcome: Met This Shift  Goal: Absence of physical injury  Description  Absence of physical injury  1/20/2020 0322 by Coleen Manning RN  Outcome: Met This Shift  1/19/2020 1702 by Anita Tovar RN  Outcome: Met This Shift     Problem: HEMODYNAMIC STATUS  Goal: Hemoglobin within specified parameters  Outcome: Met This Shift     Problem: Pain:  Goal: Pain level will decrease  Description  Pain level will decrease     Outcome: Met This Shift     Problem: Mental Status - Impaired:  Goal: Mental status restored to baseline  Outcome: Met This Shift     Problem: Activity:  Goal: Fatigue will decrease  Description  Fatigue will decrease  1/20/2020 0322 by Coleen Manning RN  Outcome: Met This Shift  1/19/2020 1702 by Anita Tovar RN  Outcome: Met This Shift  Goal: Ability to tolerate increased activity will improve  Description  Ability to tolerate increased activity will improve  1/20/2020 0322 by Coleen Manning RN  Outcome: Met This Shift  1/19/2020 1702 by Anita Tovar RN  Outcome: Met This Shift     Problem:  Bowel/Gastric:  Goal: Ability to achieve a regular elimination pattern will improve  Description  Ability to achieve a regular elimination pattern will improve  Outcome: Met This Shift     Problem: Cardiac:  Goal: Ability to maintain an adequate cardiac output will improve  Description  Ability to maintain an adequate cardiac output will improve  Outcome: Met This Shift  Goal: Ability to maintain adequate ventilation will improve  Description  Ability to maintain adequate ventilation will improve  Outcome: Met This Shift  Goal: Ability to achieve and maintain adequate cardiopulmonary perfusion will improve  Description  Ability to achieve and maintain adequate cardiopulmonary perfusion will
Problem: Falls - Risk of:  Goal: Will remain free from falls  Description  Will remain free from falls  Outcome: Met This Shift     Problem: Activity:  Goal: Fatigue will decrease  Description  Fatigue will decrease  Outcome: Met This Shift  Goal: Ability to tolerate increased activity will improve  Description  Ability to tolerate increased activity will improve  Outcome: Met This Shift     Problem:  Bowel/Gastric:  Goal: Ability to achieve a regular elimination pattern will improve  Description  Ability to achieve a regular elimination pattern will improve  Outcome: Met This Shift     Problem: Cardiac:  Goal: Ability to maintain an adequate cardiac output will improve  Description  Ability to maintain an adequate cardiac output will improve  Outcome: Met This Shift
Problem: Falls - Risk of:  Goal: Will remain free from falls  Description  Will remain free from falls  Outcome: Met This Shift  Goal: Absence of physical injury  Description  Absence of physical injury  Outcome: Met This Shift     Problem:  Activity:  Goal: Fatigue will decrease  Description  Fatigue will decrease  Outcome: Met This Shift  Goal: Ability to tolerate increased activity will improve  Description  Ability to tolerate increased activity will improve  Outcome: Met This Shift
Problem: Falls - Risk of:  Goal: Will remain free from falls  Description  Will remain free from falls  Outcome: Met This Shift  Goal: Absence of physical injury  Description  Absence of physical injury  Outcome: Met This Shift     Problem: Activity:  Goal: Fatigue will decrease  Description  Fatigue will decrease  Outcome: Met This Shift  Goal: Ability to tolerate increased activity will improve  Description  Ability to tolerate increased activity will improve  Outcome: Met This Shift     Problem:  Bowel/Gastric:  Goal: Ability to achieve a regular elimination pattern will improve  Description  Ability to achieve a regular elimination pattern will improve  Outcome: Met This Shift     Problem: Cardiac:  Goal: Ability to maintain an adequate cardiac output will improve  Description  Ability to maintain an adequate cardiac output will improve  Outcome: Met This Shift  Goal: Ability to maintain adequate ventilation will improve  Description  Ability to maintain adequate ventilation will improve  Outcome: Completed  Goal: Ability to achieve and maintain adequate cardiopulmonary perfusion will improve  Description  Ability to achieve and maintain adequate cardiopulmonary perfusion will improve  Outcome: Met This Shift     Problem: Nutritional:  Goal: Maintenance of adequate nutrition will improve  Description  Maintenance of adequate nutrition will improve  Outcome: Completed     Problem: Physical Regulation:  Goal: Will show no signs and symptoms of excessive bleeding  Description  Will show no signs and symptoms of excessive bleeding  Outcome: Met This Shift  Goal: Complications related to the disease process, condition or treatment will be avoided or minimized  Description  Complications related to the disease process, condition or treatment will be avoided or minimized  Outcome: Met This Shift     Problem: Safety:  Goal: Ability to remain free from injury will improve  Description  Ability to remain free from injury
Problem: Physical Regulation:  Goal: Will show no signs and symptoms of excessive bleeding  Description  Will show no signs and symptoms of excessive bleeding  Outcome: Met This Shift  Goal: Complications related to the disease process, condition or treatment will be avoided or minimized  Description  Complications related to the disease process, condition or treatment will be avoided or minimized  Outcome: Met This Shift     Problem: Safety:  Goal: Ability to remain free from injury will improve  Description  Ability to remain free from injury will improve  Outcome: Met This Shift     Problem: Sensory:  Goal: General experience of comfort will improve  Description  General experience of comfort will improve  Outcome: Met This Shift     Problem: Skin Integrity:  Goal: Skin integrity will improve  Description  Skin integrity will improve  Outcome: Met This Shift     Problem: Tissue Perfusion:  Goal: Ability to maintain adequate tissue perfusion will improve  Description  Ability to maintain adequate tissue perfusion will improve  Outcome: Met This Shift  Goal: Ability to maintain a stable neurologic state will improve  Description  Ability to maintain a stable neurologic state will improve  Outcome: Met This Shift

## 2020-01-22 NOTE — CARE COORDINATION
CASE MANAGEMENT. ... Met with patient to discuss her ongoing hospital stay. She understands the need for hemonc consult and for blood transfusion. Will cont to follow along. Discharge plan remains home.

## 2020-01-22 NOTE — PROGRESS NOTES
GENERAL SURGERY  DAILY PROGRESS NOTE  1/22/2020  CC: melena    Subjective:  Hg dropped to below 7 and is getting a blood transfusion currently. She has not had any rectal bleeding or melanotic stool as far as she can tell. -N/V,abd pain  +F/-BM    S/p EGD,incomplete colonoscopy, barium enema    Objective:  BP (!) 114/54   Pulse 91   Temp 98.8 °F (37.1 °C) (Oral)   Resp 16   Ht 5' 4\" (1.626 m)   Wt 125 lb 3.2 oz (56.8 kg)   SpO2 96%   BMI 21.49 kg/m²     GENERAL:  Laying in bed, awake, alert, cooperative, no apparent distress  HEAD: Normocephalic, atraumatic  EYES: No sclera icterus, pupils equal  LUNGS:  No increased work of breathing  CARDIOVASCULAR:  Regular rate  ABDOMEN:  Soft, non-tender, non-distended  MUSCULOSKELETAL: No edema or swelling  SKIN: Warm and dry    Assessment/Plan:  77 y.o. female with anemia, melanotic stool s/p normal EGD and incomplete colonoscopy, diverticulosis    Will follow up Hg after blood transfusion. I suspect this is a diverticular bleed vs small bowel AVM. Await heme-onc recs.     Reg Sorto MD  General Surgery

## 2020-01-22 NOTE — PROGRESS NOTES
Subjective    Patient seen and examined. Blood counts are dropping, . Objective:  Vitals:    01/22/20 1252   BP: (!) 114/54   Pulse: 91   Resp: 16   Temp: 98.8 °F (37.1 °C)   SpO2: 96%      General Appearance:    Alert, cooperative, no distress, appears stated age   Head:    Normocephalic, without obvious abnormality, atraumatic   Eyes:    PERRL, conjunctiva/corneas clear, EOM's intact, fundi     benign, both eyes        Ears:    Normal TM's and external ear canals, both ears   Nose:   Nares normal, septum midline, mucosa normal, no drainage    or sinus tenderness   Throat:   Lips, mucosa, and tongue normal; teeth and gums normal   Neck:   Supple, symmetrical, trachea midline, no adenopathy;        thyroid:  No enlargement/tenderness/nodules; no carotid    bruit or JVD   Back:     Symmetric, no curvature, ROM normal, no CVA tenderness   Lungs:     Clear to auscultation bilaterally, respirations unlabored   Chest wall:    No tenderness or deformity   Heart:    Regular rate and rhythm, S1 and S2 normal, no murmur, rub   or gallop   Abdomen:     Soft, non-tender, bowel sounds active all four quadrants,     no masses, no organomegaly            Extremities:   Extremities normal, atraumatic, no cyanosis or edema   Pulses:   2+ and symmetric all extremities   Skin:   Skin color, texture, turgor normal, no rashes or lesions   Lymph nodes:   Cervical, supraclavicular, and axillary nodes normal   Neurologic:   CNII-XII intact.  Normal strength, sensation and reflexes       throughout             sodium chloride  20 mL Intravenous Once    pantoprazole  40 mg Oral QAM AC    clonazePAM  1 mg Oral Nightly    sodium chloride (PF)  10 mL Intravenous Daily           Labs:    Hemoglobin/Hematocrit:    Lab Results   Component Value Date    HGB 6.8 01/22/2020    HCT 21.9 01/22/2020        Imaging:      Assessment and Plan:    Patient Active Problem List   Diagnosis    Fracture, Colles, left, closed    Anemia    Hyperlipidemia    Back pain    GERD (gastroesophageal reflux disease)       Blood transfusion,  Consult to Hem/Onc service

## 2020-01-22 NOTE — CONSULTS
CALCIUM 9.0 01/17/2020    PROT 6.9 01/17/2020    LABALBU 4.0 01/17/2020    BILITOT 1.8 (H) 01/17/2020    ALKPHOS 62 01/17/2020    AST 31 01/17/2020    ALT 13 01/17/2020    LABGLOM >60 01/17/2020    GFRAA >60 01/17/2020       Lab Results   Component Value Date    IRON 136 01/18/2020    TIBC 261 01/18/2020    FERRITIN 623 01/18/2020           Radiology-    FL BARIUM ENEMA   Final Result   Extensive descending and sigmoid colonic diverticulosis. No evidence of colonic stricture or large polyp. Small polyps on the order of approximately 1 cm would be difficult to   exclude on the provided single-contrast views. This examination was performed and dictated by Bishop Zavala PA-C with   indirect supervision by Charlotte Chávez MD, who has reviewed the provided   imaging and has revised the report as necessary. CT ABDOMEN PELVIS W IV CONTRAST Additional Contrast? None   Final Result   There is no acute inflammation or retroperitoneal hematoma. There is   diverticulosis of the constipation. ASSESSMENT/PLAN :  78 yo female  Anemia, mildly macrocytic  Suspected diverticular bleeding    - Review of lab findings support a bleed. Smear review is consistent with a GI bleed and subsequent macrocytosis due to responsive reticulocytosis, and this would explain the mild macrocytosis as well  - Agree with Dr. Maryuri Branham this is most likely a diverticular bleed or slightly less likely a bleeding SB AVM  - Mildly elevated T bili on admission of 1.8, will check LDH and haptoglobin. No schistocytes noted on automated differential   - Repeat full CBC/CMP  - B12/folate  - Reticulocytes  - Transfuse for Hgb <7  - Will follow    Thank you for this consult.  Please call with further questions or concerns      Electronically signed by Adan Zuniga MD on 1/22/2020 at 3:33 PM

## 2020-01-22 NOTE — PROGRESS NOTES
Nutrition Education    Type and Reason for Visit: Consult, Patient Education    Nutrition Assessment:  Provided patient with the High Fiber diet guidelines as well as contact information for any additional questions. · Verbally reviewed information with Patient and Family  · Written educational materials provided. · Contact name and number provided.     Electronically signed by Eliezer Rodriguez MS, RD, LD on 1/22/20 at 11:32 AM    Contact Number: [phone]

## 2020-01-23 NOTE — PROGRESS NOTES
GENERAL SURGERY  DAILY PROGRESS NOTE  1/23/2020  CC: melena    Subjective:  Hg stable. No signs of bleeding. Tolerating a diet    Objective:  BP (!) 114/57   Pulse 91   Temp 98 °F (36.7 °C) (Oral)   Resp 16   Ht 5' 4\" (1.626 m)   Wt 125 lb 3.2 oz (56.8 kg)   SpO2 97%   BMI 21.49 kg/m²     GENERAL:  Laying in bed, awake, alert, cooperative, no apparent distress  HEAD: Normocephalic, atraumatic  EYES: No sclera icterus, pupils equal  LUNGS:  No increased work of breathing  CARDIOVASCULAR:  Regular rate  ABDOMEN:  Soft, non-tender, non-distended  MUSCULOSKELETAL: No edema or swelling  SKIN: Warm and dry    Assessment/Plan:  77 y.o. female with anemia, melanotic stool s/p normal EGD and incomplete colonoscopy, diverticulosis    Will follow up Hg after blood transfusion. I suspect this is a diverticular bleed vs small bowel AVM.   Malcolm Frazier for [facility] home  Follow up in 2 weeks    Moira Cosby MD  General Surgery

## 2020-02-03 NOTE — PROGRESS NOTES
Progress Note - Follow up    Patient's Name/Date of Birth: Mary Braydon / 1950    Date: 2/3/2020    PCP: Ezequiel De Dios MD    Referring Physician:   Don Stevenson MD  [phone]    Chief Complaint   Patient presents with    Results     EGD and colonoscopy follow up       HPI:  The patient has not had any further bleeding at home. She is on iron. She said she is not having any constipation. She is eating a high fiber diet. Patient's medications, allergies, past medical, surgical, social and family histories were reviewed and updated as appropriate. Review of Systems  Constitutional: negative  Respiratory: negative  Cardiovascular: negative  Gastrointestinal: as in hpi  Genitourinary:negative  Integument/breast: negative    Physical Exam:  Vitals:    02/03/20 1326   BP: 128/68   Pulse: 109   Resp: 20   Temp: 98.2 °F (36.8 °C)   SpO2: 100%       General appearance: alert, cooperative and in no acute distress. Lungs: clear to auscultation bilaterally  Heart: regular rate and rhythm  Abdomen:  soft, non-tender, no masses,  no organomegaly  Musculoskeletal: symmetrical without clubbing cyanosis or edema. Skin: normal    Data Reviewed:   Pathology: Diagnosis:  Stomach, antrum, biopsy:   - Antral mucosa showing mild inactive chronic inflammation (chronic gastritis);   - Negative for Helicobacter pylori organisms by immunostaining. Impression/Plan:  77y.o. year old female with likely diverticular bleed    Check CBC today  Discussed with her the etiology of her bleeding is not certain though I suspect diverticulosis. I told her that it could be small bowel and this could be further evaluated with capsule endoscopy.    Discussed high fiber diet  Increase water intake  Follow up if rebleeding occurs    Electronically by Bar Sahni MD, General Surgery  on 2/3/2020 at 1:44 PM      Send copy of H&P to PCP, Ezequiel De Dios MD and referring physician, Don Stevenson MD

## 2020-03-06 NOTE — PROGRESS NOTES
Patient tolerated transfusion well without complaint. IV site removed and site looks good. Patient discharged ambulatory. Post transfusion intructions reviewed.
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
## 2020-04-08 NOTE — ED PROVIDER NOTES
HENT: Negative for congestion, ear discharge, ear pain, hearing loss, sinus pressure, sinus pain, tinnitus, trouble swallowing and voice change. Eyes: Negative for photophobia, pain, redness and visual disturbance. Respiratory: Negative for cough, chest tightness and shortness of breath. Cardiovascular: Negative for chest pain and palpitations. Gastrointestinal: Positive for constipation. Negative for abdominal pain, blood in stool, diarrhea, nausea and vomiting. Genitourinary: Negative for dysuria, flank pain, frequency, hematuria and urgency. Musculoskeletal: Positive for back pain. Negative for arthralgias, myalgias, neck pain and neck stiffness. Skin: Positive for color change and pallor. Negative for rash and wound. Allergic/Immunologic: Negative for immunocompromised state. Neurological: Negative for facial asymmetry, speech difficulty, weakness, light-headedness and headaches. Hematological: Negative for adenopathy. Psychiatric/Behavioral: Positive for confusion (baseline). Physical Exam  Vitals signs and nursing note reviewed. Constitutional:       General: She is not in acute distress. Appearance: She is well-developed. She is not diaphoretic. HENT:      Head: Normocephalic and atraumatic. Mouth/Throat:      Pharynx: No oropharyngeal exudate. Eyes:      General: No scleral icterus. Right eye: No discharge. Left eye: No discharge. Conjunctiva/sclera: Conjunctivae normal.      Pupils: Pupils are equal, round, and reactive to light. Neck:      Musculoskeletal: Normal range of motion and neck supple. No neck rigidity. Comments: Negative Brudzinski  Cardiovascular:      Rate and Rhythm: Normal rate and regular rhythm. Heart sounds: Normal heart sounds. No murmur. No friction rub. No gallop. Pulmonary:      Effort: Pulmonary effort is normal. No respiratory distress. Breath sounds: Normal breath sounds. No wheezing or rales. Abdominal:      General: Bowel sounds are normal. There is no distension. Palpations: Abdomen is soft. Tenderness: There is no abdominal tenderness. There is no guarding or rebound. Musculoskeletal:      Lumbar back: She exhibits no tenderness, no swelling, no edema and no deformity. Back:       Right lower leg: No edema. Left lower leg: No edema. Skin:     General: Skin is warm and dry. Capillary Refill: Capillary refill takes less than 2 seconds. Coloration: Skin is pale. Skin is not jaundiced. Findings: No bruising, erythema or rash. Neurological:      Mental Status: She is alert. She is disoriented. Cranial Nerves: No cranial nerve deficit. Sensory: No sensory deficit. Motor: No weakness, atrophy or abnormal muscle tone. Deep Tendon Reflexes: Reflexes are normal and symmetric. Reflexes normal.          Procedures     MDM     ED Course as of Apr 08 1418 Wed Apr 08, 2020   1100 Lactate 5. IVF ordered. [JL]   1100 Lactic Acid(!!): 5.0 [JL]   1100 Hemoglobin Quant(!): 7.1 [JL]   1312 Patient is anemic but not below transfusion threshold she is otherwise stable nontoxic has no active or ongoing hemorrhage or bleeding. Will avoid unnecessary risk to the patient of giving unnecessary transfusion at this time. I explained this to the patient and her , who expressed dissatisfaction with this answer. I explained to them that transfusing blood is not without risks or complications and you must adhere to guidelines and criteria to help determine when to transfuse blood. She has an appointment tomorrow with her hematology center. I advised her to follow-up later in with her PCP for continued evaluation of her anemia return sooner if symptoms worsen in the interim. [JL]      ED Course User Index  [JL] Devra Leventhal, DO     Patient presented for concerns for anemia given that her  thought she appeared pale.   She had no worsening mental 130 - 450 E9/L    MPV 14.4 (H) 7.0 - 12.0 fL    Neutrophils % 73.9 43.0 - 80.0 %    Lymphocytes % 20.0 20.0 - 42.0 %    Monocytes % 4.3 2.0 - 12.0 %    Eosinophils % 0.0 0.0 - 6.0 %    Basophils % 0.0 0.0 - 2.0 %    Neutrophils Absolute 8.44 (H) 1.80 - 7.30 E9/L    Lymphocytes Absolute 2.22 1.50 - 4.00 E9/L    Monocytes Absolute 0.44 0.10 - 0.95 E9/L    Eosinophils Absolute 0.00 (L) 0.05 - 0.50 E9/L    Basophils Absolute 0.00 0.00 - 0.20 E9/L    Metamyelocytes Relative 0.9 0.0 - 1.0 %    Myelocyte Percent 0.9 0 - 0 %    nRBC 3.5 /100 WBC    Anisocytosis 2+     Polychromasia 1+     Hypochromia 1+     Poikilocytes 1+     Ovalocytes 1+     Tear Drop Cells 1+    Comprehensive Metabolic Panel w/ Reflex to MG   Result Value Ref Range    Sodium 135 132 - 146 mmol/L    Potassium reflex Magnesium 3.5 3.5 - 5.0 mmol/L    Chloride 95 (L) 98 - 107 mmol/L    CO2 22 22 - 29 mmol/L    Anion Gap 18 (H) 7 - 16 mmol/L    Glucose 275 (H) 74 - 99 mg/dL    BUN 26 (H) 8 - 23 mg/dL    CREATININE 0.5 0.5 - 1.0 mg/dL    GFR Non-African American >60 >=60 mL/min/1.73    GFR African American >60     Calcium 8.8 8.6 - 10.2 mg/dL    Total Protein 7.0 6.4 - 8.3 g/dL    Alb 2.6 (L) 3.5 - 5.2 g/dL    Total Bilirubin 0.9 0.0 - 1.2 mg/dL    Alkaline Phosphatase 220 (H) 35 - 104 U/L    ALT 52 (H) 0 - 32 U/L    AST 23 0 - 31 U/L   Troponin   Result Value Ref Range    Troponin <0.01 0.00 - 0.03 ng/mL   Lactic Acid, Plasma   Result Value Ref Range    Lactic Acid 5.0 (HH) 0.5 - 2.2 mmol/L   Magnesium   Result Value Ref Range    Magnesium 2.3 1.6 - 2.6 mg/dL   EKG 12 Lead   Result Value Ref Range    Ventricular Rate 115 BPM    Atrial Rate 115 BPM    P-R Interval 130 ms    QRS Duration 76 ms    Q-T Interval 362 ms    QTc Calculation (Bazett) 500 ms    P Axis 77 degrees    R Axis 75 degrees    T Axis 61 degrees   TYPE AND SCREEN   Result Value Ref Range    ABO/Rh A NEG     Antibody Screen NEG        Radiology:  No orders to display

## 2020-04-14 PROBLEM — R33.9 URINARY RETENTION: Status: ACTIVE | Noted: 2020-01-01

## 2020-04-14 PROBLEM — R41.3 MEMORY DEFICIT: Status: ACTIVE | Noted: 2020-01-01

## 2020-04-14 PROBLEM — R62.7 FAILURE TO THRIVE IN ADULT: Status: ACTIVE | Noted: 2020-01-01

## 2020-04-14 PROBLEM — F41.9 ANXIETY: Status: ACTIVE | Noted: 2020-01-01

## 2020-04-14 NOTE — ED NOTES
Faxed SBAR to floor, spoke with [facility] who received fax. Pt ready to go.      Amparo Reina RN  04/14/20 3428

## 2020-04-14 NOTE — PROGRESS NOTES
Called and spoke with pt's , Tanvir Luna. Updated  on plan of care. Gave  pt's room number and room telephone number and also gave him phone number to nurse's station.

## 2020-04-14 NOTE — PROGRESS NOTES
Database complete. Medications reconciled. Care plans and education initiated. Per , Nichole Nina is to have a Pillcam swallow study next Monday with Dr. Bravo Quintana, and recently had upper/lower GI 's that were negative. 17 lb weight loss over past few months.

## 2020-04-14 NOTE — H&P
Allergies: Codeine    Social History     Tobacco Use    Smoking status: Never Smoker    Smokeless tobacco: Never Used   Substance Use Topics    Alcohol use: Yes     Comment: social only        Review of Systems:  Respiratory: negative for cough and hemoptysis  Cardiovascular: negative for chest pain and dyspnea  Gastrointestinal: negative for abdominal pain, positive for alternating sx of diarrhea and constipation  Genitourinary:positive for retention  Derm: negative for rash and skin lesion(s)  Neurological: negative for seizures and tremors  Endocrine: negative for diabetic symptoms including polydipsia and polyuria    OBJECTIVE    Vitals:    04/14/20 1325   BP: (!) 114/52   Pulse: 74   Resp: 16   Temp: 97.7 °F (36.5 °C)   SpO2: 95%       Physical Exam:  General Appearance:     cooperative, no distress, appears stated age   Head:    Normocephalic, without obvious abnormality, atraumatic   Eyes:    PERRL, conjunctiva/corneas clear, EOM's intact, fundi             Ears:    Normal TM's and external ear canals, both ears   Nose:   Nares normal, septum midline, mucosa normal, no drainage    or sinus tenderness   Throat:   Lips, mucosa, and tongue normal; teeth and gums normal   Neck:   Supple, symmetrical, trachea midline, no adenopathy;        thyroid:  No enlargement/tenderness/nodules; no carotid    bruit or JVD   Back:     Symmetric, no curvature,     Lungs:     Clear to auscultation bilaterally, respirations unlabored   Chest wall:    No tenderness or deformity   Heart:    Regular rate and rhythm, S1 and S2 normal, no murmur, rub   or gallop   Abdomen:     Soft, non-tender, bowel sounds active all four quadrants,     no masses, no organomegaly            Extremities:   Extremities normal, atraumatic, no cyanosis or edema   Pulses:   2+ and symmetric all extremities   Skin:  pale   Lymph nodes:   Cervical, supraclavicular, and axillary nodes normal   Neurologic:   CNII-XII intact.  Normal strength, sensation and reflexes       throughout             sodium chloride flush  10 mL Intravenous 2 times per day    sodium chloride  20 mL Intravenous Once    vitamin D  2,000 Units Oral QAM    clonazePAM  1 mg Oral Nightly    donepezil  5 mg Oral Dinner    ferrous sulfate  325 mg Oral TID WC    therapeutic multivitamin-minerals  1 tablet Oral Daily    [START ON 4/15/2020] pantoprazole  40 mg Oral QAM AC         CBC with Differential:    Lab Results   Component Value Date    WBC 12.9 2020    RBC 2.14 2020    HGB 6.3 2020    HCT 20.1 2020     2020    MCV 93.9 2020    MCH 29.4 2020    MCHC 31.3 2020    RDW 19.9 2020    NRBC 2.0 2020    METASPCT 1.0 2020    LYMPHOPCT 9.0 2020    MONOPCT 3.0 2020    MYELOPCT 4.0 2020    BASOPCT 0.0 2020    MONOSABS 0.39 2020    LYMPHSABS 1.16 2020    EOSABS 0.13 2020    BASOSABS 0.00 2020     CMP:    Lab Results   Component Value Date     2020    K 3.8 2020    K 3.5 2020    CL 98 2020    CO2 23 2020    BUN 43 2020    CREATININE 1.0 2020    GFRAA >60 2020    LABGLOM 55 2020    GLUCOSE 222 2020    GLUCOSE 91 2011    PROT 6.9 2020    LABALBU 2.7 2020    LABALBU 4.5 2011    CALCIUM 8.6 2020    BILITOT 1.0 2020    ALKPHOS 150 2020    AST 21 2020    ALT 21 2020      Imaging:  CT ABDOMEN PELVIS W IV CONTRAST Additional Contrast? None [[id number]] Resulted: 2044      Order Status: Completed Updated: 20     Narrative:       Patient MRN:  [id number]  : 1950  Age: 77 years  Gender: Female    Order Date:  2020 7:45 AM    EXAM: CT ABDOMEN PELVIS W IV CONTRAST  Dosage: 599.3 mGY-cm  Contrast: 110 mL Isovue-370  INDICATION:  fatigue, abdominal pain   fatigue, abdominal pain     COMPARISON: None    FINDINGS:    There is some right basilar

## 2020-04-14 NOTE — PROGRESS NOTES
Spoke with Fahad Goel RN in ED. She did not draw H/H post transfusion, nor does she have lab stickers. Notified lab and RN Jose Ramon Sensor.   Marcel Roberts RN  2:07 PM

## 2020-04-14 NOTE — CONSULTS
[facility]  Hematology/Oncology  Consult      Patient Name: Ada Camacho  YOB: 1950  PCP: Arlyn Menendez MD   Referring Provider:      Reason for Consultation:   Chief Complaint   Patient presents with    Memory Loss     memory loss over the past 6 months, increase in severity past 3 days    Anorexia     not eating for the past 3 days, some vomiting    Diarrhea     x 3 days        History of Present Illness:  67-year-old woman hospitalized to the emergency room with generalized fatigue, weakness, low back pain with altered mental status. She had been evaluated several months for anemia. She had been seen by Dr. Aldo Tyler and her work-up was consistent with iron deficiency anemia attributed to diverticular bleed and possibly small bowel AVM. Her macrocytosis was attributed to reticulocytosis suggesting appropriate bone marrow response to her GI blood losses she had been seen by GI and underwent. EGD as well as colonoscopy and was supposed to have a capsule enteroscopy by Dr. Belle Tenorio but apparently it was not done yet. .  CT scan of abdomen and pelvis done on admission showed bibasilar pulmonary interstitial scarring. Liver and pancreas appeared unremarkable. Distended with fecal debris. There was no acute abdominal or pelvic pathology. Mild hydro-ureter and bilateral hydronephrosis was described with a bladder distention. Her CMP shows prerenal azotemia with a BUN of 43 with a serum creatinine of 1.0 and her elevated BUN could be related to GI bleeding. Her hepatic panel shows low albumin of 2.7. Her hemoglobin on admission was low at 6.3 with an MCV of 93. She had moderate neutrophilic leukocytosis with lymphopenia.     Diagnostic Data:     Past Medical History:   Diagnosis Date    Anemia     Hyperlipidemia     Iron deficiency anemia        Patient Active Problem List    Diagnosis Date Noted    Failure to thrive in adult 04/14/2020    Memory deficit 04/14/2020  Anxiety 04/14/2020    Urinary retention 04/14/2020    Anemia 01/17/2020    Hyperlipidemia 01/17/2020    Back pain 01/17/2020    GERD (gastroesophageal reflux disease) 01/17/2020        Past Surgical History:   Procedure Laterality Date    COLONOSCOPY N/A 1/20/2020    COLONOSCOPY DIAGNOSTIC performed by Rhetta Gottron, MD at [address] Left 9/29/2019    RADIUS OPEN REDUCTION INTERNAL FIXATION performed by Neno Angela DO at [facility] ENDOSCOPY N/A 1/19/2020    EGD ESOPHAGOGASTRODUODENOSCOPY performed by Kishore Tamez DO at [facility] OR       Family History  Family History   Problem Relation Age of Onset    Alzheimer's Disease Mother     High Blood Pressure Brother        Social History    TOBACCO:   reports that she has never smoked. She has never used smokeless tobacco.  ETOH:   reports current alcohol use. Home Medications  Prior to Admission medications    Medication Sig Start Date End Date Taking? Authorizing Provider   Multiple Vitamins-Minerals (OCUVITE EYE + MULTI PO) Take 1 tablet by mouth daily    Yes Historical Provider, MD   donepezil (ARICEPT) 5 MG tablet Take 5 mg by mouth Daily with supper   Yes Historical Provider, MD   Cholecalciferol (VITAMIN D3) 50 MCG (2000 UT) CAPS Take 4,000 Units by mouth every morning   Yes Historical Provider, MD   acetaminophen (TYLENOL) 500 MG tablet Take 1,000 mg by mouth 2 times daily as needed    Yes Historical Provider, MD   ferrous sulfate 325 (65 Fe) MG tablet Take 325 mg by mouth 3 times daily (with meals)   Yes Historical Provider, MD   clonazePAM (KLONOPIN) 2 MG tablet Take 1 mg by mouth nightly. 12/29/19  Yes Historical Provider, MD       Allergies  Allergies   Allergen Reactions    Codeine        Review of Systems:      Relevant for generalized fatigue, weakness, intermittent episodes of confusion, poor appetite. Denies any melena or hematochezia.     Objective  BP (!) 114/52   Pulse Contrast: 110 mL Isovue-370 INDICATION:  fatigue, abdominal pain fatigue, abdominal pain COMPARISON: None FINDINGS:  There is some right basilar interstitial scarring. There is a small amount of bibasilar pleural thickening. Liver is normal. There is a small amount of sludge in the gallbladder. Pancreas is normal. There is mild caliectasis in both kidneys. Ureters are mildly dilated. Bladder is significantly distended. There is no renal masses. Spleen is normal. There is a moderate amount residual fecal debris in the right side of the colon. Rectum is distended with fecal debris. No abdominal or pelvic lymphadenopathy or fluid collections are noted. Considerable bladder distention and secondary mild bilateral hydronephrosis and hydroureter. No obstructing etiology is noted. Significant amount of residual fecal debris in the right side of the colon as well as the rectum with significant rectal distention     Xr Chest Portable    Result Date: 2020  Patient MRN: [id number] : 1950 Age:  77 years Gender: Female Order Date: 2020 7:45 AM Exam: XR CHEST PORTABLE Number of Images: 1 view Indication:   Acute severe fatigue. Altered mental status Comparison: None. FINDINGS: Heart and pulmonary vascularity normal. Lungs clear. Costophrenic angles sharp. Normal aorta. No acute cardiopulmonary findings. ASSESSMENT/PLAN : 70-year-old woman    1- Severe anemia likely related to intermittent GI blood losses from diverticular bleed versus AVM in the small bowel. She had been seen by GI previously with plans for capsule enteroscopy. Her prior EGD and colonoscopy were negative for active bleeding. She has required multiple packed RBC transfusion over the past few months. Her iron studies, B12 and folate, reticulocyte count, haptoglobin blood smear will be reviewed. She will be transfused with packed RBCs.      2-Distended urinary bladder with mild bilateral hydronephrosis no CT scan of abdomen and

## 2020-04-15 PROBLEM — E43 SEVERE PROTEIN-CALORIE MALNUTRITION (HCC): Chronic | Status: ACTIVE | Noted: 2020-01-01

## 2020-04-15 NOTE — CONSULTS
(49.9 kg)    Height:             Histories  Past Medical History:   Diagnosis Date    Anemia     Hyperlipidemia     Iron deficiency anemia      Past Surgical History:   Procedure Laterality Date    COLONOSCOPY N/A 1/20/2020    COLONOSCOPY DIAGNOSTIC performed by Antony Morgan MD at [address] [place] [address] SURGERY Left 9/29/2019    RADIUS OPEN REDUCTION INTERNAL FIXATION performed by Lara Cheek DO at [place] [address] ENDOSCOPY N/A 1/19/2020    EGD ESOPHAGOGASTRODUODENOSCOPY performed by Belle Joe DO at [facility] OR     Family History   Problem Relation Age of Onset    Alzheimer's Disease Mother     High Blood Pressure Brother        Home Medications  Prior to Admission medications    Medication Sig Start Date End Date Taking? Authorizing Provider   Multiple Vitamins-Minerals (OCUVITE EYE + MULTI PO) Take 1 tablet by mouth daily    Yes Historical Provider, MD   donepezil (ARICEPT) 5 MG tablet Take 5 mg by mouth Daily with supper   Yes Historical Provider, MD   Cholecalciferol (VITAMIN D3) 50 MCG (2000 UT) CAPS Take 4,000 Units by mouth every morning   Yes Historical Provider, MD   acetaminophen (TYLENOL) 500 MG tablet Take 1,000 mg by mouth 2 times daily as needed    Yes Historical Provider, MD   ferrous sulfate 325 (65 Fe) MG tablet Take 325 mg by mouth 3 times daily (with meals)   Yes Historical Provider, MD   clonazePAM (KLONOPIN) 2 MG tablet Take 1 mg by mouth nightly.   12/29/19  Yes Historical Provider, MD       Allergies  Codeine    Social Hx  Social History     Socioeconomic History    Marital status:      Spouse name: Not on file    Number of children: Not on file    Years of education: Not on file    Highest education level: Not on file   Occupational History    Not on file   Social Needs    Financial resource strain: Not on file    Food insecurity     Worry: Not on file     Inability: Not on file    Transportation needs     Medical: Not on file Non-medical: Not on file   Tobacco Use    Smoking status: Never Smoker    Smokeless tobacco: Never Used   Substance and Sexual Activity    Alcohol use: Yes     Comment: social only    Drug use: Never    Sexual activity: Not Currently     Partners: Male   Lifestyle    Physical activity     Days per week: Not on file     Minutes per session: Not on file    Stress: Not on file   Relationships    Social connections     Talks on phone: Not on file     Gets together: Not on file     Attends Yazidism service: Not on file     Active member of club or organization: Not on file     Attends meetings of clubs or organizations: Not on file     Relationship status: Not on file    Intimate partner violence     Fear of current or ex partner: Not on file     Emotionally abused: Not on file     Physically abused: Not on file     Forced sexual activity: Not on file   Other Topics Concern    Not on file   Social History Narrative    Not on file       Review of Systems  All bolded are positive; please see HPI  General:  Fever, chills, diaphoresis, fatigue, malaise, night sweats, weight loss  Psychological:  Anxiety, disorientation, hallucinations. ENT:  Epistaxis, headaches, vertigo, visual changes. Cardiovascular:  Chest pain, irregular heartbeats, palpitations, paroxysmal nocturnal dyspnea. Respiratory:  Shortness of breath, coughing, sputum production, hemoptysis, wheezing, orthopnea.   Gastrointestinal:  Nausea, vomiting, diarrhea, heartburn, constipation, abdominal pain, hematemesis, hematochezia, melena, acholic stools  Genito-Urinary:  Dysuria, urgency, frequency, hematuria  Musculoskeletal:  Joint pain, joint stiffness, joint swelling, muscle pain  Neurology:  Headache, focal neurological deficits, weakness, numbness, paresthesia  Derm:  Rashes, ulcers, excoriations, bruising  Extremities:  Decreased ROM, peripheral edema, mottling    Physical Examination  Vitals:  BP (!) 114/54   Pulse 101   Temp 99 °F (37.2 blood    Collection Time: 20  2:55 PM   Result Value Ref Range    Hemoglobin 7.8 (L) 11.5 - 15.5 g/dL    Hematocrit 24.6 (L) 34.0 - 48.0 %   EKG 12 Lead    Collection Time: 20  5:04 PM   Result Value Ref Range    Ventricular Rate 142 BPM    Atrial Rate 142 BPM    P-R Interval 120 ms    QRS Duration 72 ms    Q-T Interval 350 ms    QTc Calculation (Bazett) 538 ms    R Axis 27 degrees    T Axis 23 degrees   Hemoglobin and hematocrit, blood    Collection Time: 20  9:50 PM   Result Value Ref Range    Hemoglobin 6.7 (L) 11.5 - 15.5 g/dL    Hematocrit 20.9 (L) 34.0 - 48.0 %   CBC    Collection Time: 04/15/20  3:25 AM   Result Value Ref Range    WBC 9.3 4.5 - 11.5 E9/L    RBC 2.77 (L) 3.50 - 5.50 E12/L    Hemoglobin 8.0 (L) 11.5 - 15.5 g/dL    Hematocrit 24.3 (L) 34.0 - 48.0 %    MCV 87.7 80.0 - 99.9 fL    MCH 28.9 26.0 - 35.0 pg    MCHC 32.9 32.0 - 34.5 %    RDW 17.1 (H) 11.5 - 15.0 fL    Platelets 231 633 - 146 E9/L    MPV 13.2 (H) 7.0 - 12.0 fL   Basic metabolic panel    Collection Time: 04/15/20  3:25 AM   Result Value Ref Range    Sodium 135 132 - 146 mmol/L    Potassium 3.1 (L) 3.5 - 5.0 mmol/L    Chloride 100 98 - 107 mmol/L    CO2 25 22 - 29 mmol/L    Anion Gap 10 7 - 16 mmol/L    Glucose 120 (H) 74 - 99 mg/dL    BUN 19 8 - 23 mg/dL    CREATININE 0.7 0.5 - 1.0 mg/dL    GFR Non-African American >60 >=60 mL/min/1.73    GFR African American >60     Calcium 8.2 (L) 8.6 - 10.2 mg/dL       Imaging  Ct Abdomen Pelvis W Iv Contrast Additional Contrast? None    Result Date: 2020  Patient MRN:  [id number] : 1950 Age: 77 years Gender: Female Order Date:  2020 7:45 AM EXAM: CT ABDOMEN PELVIS W IV CONTRAST Dosage: 599.3 mGY-cm Contrast: 110 mL Isovue-370 INDICATION:  fatigue, abdominal pain fatigue, abdominal pain COMPARISON: None FINDINGS:  There is some right basilar interstitial scarring. There is a small amount of bibasilar pleural thickening.  Liver is normal. There is a small amount of months  -Colonoscopy unremarkable  -EGD / push enteroscopy later today  -Non-contrast CT abdomen pelvis non-revealing for cause  -XR chest unremarkable  -Consider contrast CT chest / abdomen / pelvis  -Check thyroid panel  -Consider appetite stimulants    4. Comorbidities  -Back pain, memory impairment, urinary retention  -Per admitting / consultants      Pending bleeding scan. Repeat iron / b12 / folate levels. Monitor hgb q6h. PPI BID. Started bowel regimen. Enema ordered. Consider contrasted CT chest / abdomen / pelvis to further investigate causes for weight loss. EGD / push enteroscopy later today. Further orders will depend on patient course and results of testing. Thank you for the opportunity to participate in the care of Ms. Irineo Raygoza. Loco Baeza, APRN - CNP  9:49 AM  4/15/2020    AGree with above. Chart in office reviewed. Saw Dr. Anjelica Rodrigez with colonoscopy. Plan for EGD and possible push enteroscopy today. Pt with fecal impaction and constipation on CT, this is likely cause of loss of sphincter tone and bowel leakage, hopefully will improve when cleared out. Will attempt to get SMOG enema. See separate EGD note today.     Oliver Styles D.O.  4/15/2020

## 2020-04-15 NOTE — ANESTHESIA PRE PROCEDURE
vitamin D tablet 2,000 Units  2,000 Units Oral QAM Antony Mora MD   Stopped at 04/15/20 1015    clonazePAM (KLONOPIN) tablet 1 mg  1 mg Oral Nightly Maged I Awadalla, MD   1 mg at 04/14/20 2202    donepezil (ARICEPT) tablet 5 mg  5 mg Oral Dinner Fernandez Vieira MD   5 mg at 04/14/20 1657    ferrous sulfate (IRON 325) tablet 325 mg  325 mg Oral TID WC Fernandez Vieira MD   Stopped at 04/15/20 1014    therapeutic multivitamin-minerals 1 tablet  1 tablet Oral Daily Maged I Awadalla, MD   Stopped at 04/15/20 1015    0.9 % sodium chloride infusion   Intravenous Continuous Maged I Awadalla,  mL/hr at 04/15/20 1119      acetaminophen (TYLENOL) tablet 650 mg  650 mg Oral Q6H PRN Fernandez Vieira MD        Or    acetaminophen (TYLENOL) suppository 650 mg  650 mg Rectal Q6H PRN Maged I Awadalla, MD        promethazine (PHENERGAN) tablet 12.5 mg  12.5 mg Oral Q6H PRN Maged I Awadalla, MD        Or    ondansetron (ZOFRAN) injection 4 mg  4 mg Intravenous Q6H PRN Maged I Awadalla, MD        pantoprazole (PROTONIX) injection 40 mg  40 mg Intravenous BID Yissel Mustafa APRN - CNP   40 mg at 04/15/20 1021    And    sodium chloride (PF) 0.9 % injection 10 mL  10 mL Intravenous BID iYssel Mustafa APRN - CNP   10 mL at 04/15/20 [address] rectal enema 1 enema  1 enema Rectal Once Yissel Mustafa APRN - CNP        polyethylene glycol (GLYCOLAX) packet 17 g  17 g Oral BID Yissel Mustafa APRN - CNP   Stopped at 04/15/20 1014    0.9 % sodium chloride bolus  20 mL Intravenous Once Francisco Delarosa MD           Allergies:     Allergies   Allergen Reactions    Codeine        Problem List:    Patient Active Problem List   Diagnosis Code    Anemia D64.9    Hyperlipidemia E78.5    Back pain M54.9    GERD (gastroesophageal reflux disease) K21.9    Failure to thrive in adult R62.7    Memory deficit R41.3    Anxiety F41.9    Urinary retention R33.9       Past Medical History:

## 2020-04-15 NOTE — PROGRESS NOTES
EMR)  · % Weight Change: 14% wt loss x 3 months  · Ideal Body Wt: 120 lb (54.4 kg), % Ideal Body 89%  · BMI Classification: BMI 18.5 - 24.9 Normal Weight    Nutrition Interventions:   Continue NPO  Continued Inpatient Monitoring, Education not appropriate at this time    Nutrition Evaluation:   · Evaluation: Goals set   · Goals: nutrition progression    · Monitoring: Nutrition Progression, Skin Integrity, I&O, Mental Status/Confusion, Weight, Pertinent Labs, Diarrhea, Monitor Bowel Function, Constipation      Electronically signed by Chari Teran, MS, RD, LD on 4/15/20 at 2:48 PM EDT    Contact Number: [phone]

## 2020-04-15 NOTE — CARE COORDINATION
Social Work/Discharge Planning:  Referral made to liaison Margy Post from [facility]  and facility will review patient information. Will continue to follow.   Electronically signed by NATALIE Lujan on 4/15/2020 at 9:27 AM

## 2020-04-15 NOTE — PROGRESS NOTES
Occupational Therapy  OCCUPATIONAL THERAPY INITIAL EVALUATION      Date:4/15/2020  Patient Name: Jh Shaffer  MRN: [id number]  : 1950  Room: [address] [place] [address]    Referring Provider: Moises Garcia MD  Evaluating OT: Griselda Bur. Kennieth Bruin - [id number]    AM-PAC Daily Activity Raw Score:      Recommended Adaptive Equipment: Continue to assess. Diagnosis: Failure to thrive in adult [R62.7]  Pertinent Medical History: anemia     Precautions: falls, bed/chair alarms, skin integrity    Home Living: Patient reported that she lives with her  in a one-floor setup. Bathroom Setup: Patient indicated that she uses a BSC and sponge bathes at home. Patient is a questionable historian; no family/caregiver present to verify information gathered. Prior Level of Function (PLOF): Per patient, she needed assistance with most ADLs and needed assistance with all IADLs. Patient indicated that she spends most of her time in bed at home. Patient is a questionable historian; no family/caregiver present to verify information gathered. Pain Level: Patient reported experiencing pain in her lower back, which she did not numerically rate, but noted was chronic in nature. Cognition: Patient alert and oriented to person only; confusion demonstrated. Fair command follow demonstrated. Memory: Impaired   Sequencing: Impaired   Problem Solving: Impaired   Judgement/Safety: Impaired    Impulsivity demonstrated. Functional Assessment:   Initial Eval Status  Date: 4/15/2020 Treatment Status  Date:  Short Term Goals  Treatment Frequency/Duration: 2-5x/week for 3-7 days PRN   Feeding Min A  Setup   Grooming Mod A  SBA  (seated/standing at sink)   UB Dressing Mod A  SBA   LB Dressing Max A to adjust socks. Min A - with use of AE, as needed/appropriate   Bathing Max A  Min A - with use of AE/DME, as needed/appropriate   Toileting Dependent for perineal hygiene while standing following evidence of bowel incontinence. life.     Treatment: Patient education provided regardin) safe transfer techniques, 2) importance of OOB activities, 3) techniques to maximize independence/safety with ADLs, 4) techniques to maximize independence/participation with bed mobility. Patient indicated limited understanding. Eval Complexity: Low    Assessment of Current Deficits:   Functional Mobility [x]  ADLs [x] Strength [x]  Cognition []  Functional Transfers  [x] IADLs [x] Safety Awareness [x]  Endurance [x]  Fine Motor Coordination [] Balance [x] Vision/Perception [] Sensation []   Gross Motor Coordination [] ROM [] Delirium []                  Motor Control []    Plan of Care:   OT treatment to be provided 2-5x/week for 3-7 days to address the following, as needed, during hospitalization:  ADL Retraining [x]   Equipment Needs [x]   Neuromuscular Re-Education [x] Energy Conservation Techniques [x]  Functional Transfer Training [x] Patient and/or Family Education [x]  Functional Mobility Training [x] Environmental Modifications [x]  Cognitive Re-Training []   Compensatory Techniques for ADLs [x]  Splinting Needs []   Positioning to Improve Overall Function [x]   Therapeutic Activity [x]  Therapeutic Exercise  [x]  Visual/Perceptual: []    Delirium Prevention/Treatment  []  Other:  []    Rehab Potential: Fair to Good for established goals. Patient / Family Goal: No goal stated. Patient and/or family were instructed on functional diagnosis, prognosis/goals, and OT plan of care. Demonstrated limited understanding. Low complexity evaluation + 10 timed treatment minutes  Treatment Time In: 2146  Treatment Time Out: 1005    Treatment Charges: Minutes: Units:    Ther Ex  [id number]     Manual Therapy Zuleyka Sparks [id number] [phone]   ADL/Home Mgt [id number]     Neuro Re-ed [id number]     Group Therapy      Orthotic manage/training  14925     Total Timed Treatment 10 1     Evaluation time includes thorough review of current medical information, gathering

## 2020-04-15 NOTE — PROGRESS NOTES
Immediately prior to the procedure the patient's History and Physical was reviewed- there are no changes with the current vitals. BP (!) 114/54   Pulse 101   Temp 99 °F (37.2 °C) (Oral)   Resp 18   Ht 5' 4\" (1.626 m)   Wt 110 lb (49.9 kg)   SpO2 94%   BMI 18.88 kg/m²     No CP/SOB. Risks/benefits d/w pt and . All questions answered. Proceed with EGD/Push Enteroscopy.     Jeri Monson DO  4/15/2020  2:22 PM

## 2020-04-15 NOTE — CARE COORDINATION
Social Work/Discharge Planning:  Anders Lowery from [place] [address] states facility can accept patient and no pre-cert needed. Notified patient  of facility acceptance. PASRR completed, electronic N-17 in Epic and Ambulance form in soft chart. Will continue to follow.   Electronically signed by NATALIE Amaya on 4/15/2020 at 2:58 PM

## 2020-04-15 NOTE — PROGRESS NOTES
[facility]  Hematology/Oncology  Consult      Patient Name: Ami Sy  YOB: 1950  PCP: Terrence Easton MD   Referring Provider:      Reason for Consultation:   Chief Complaint   Patient presents with    Memory Loss     memory loss over the past 6 months, increase in severity past 3 days    Anorexia     not eating for the past 3 days, some vomiting    Diarrhea     x 3 days        Subjective:  Weakness and fatigued    History of Present Illness:  72-year-old woman hospitalized to the emergency room with generalized fatigue, weakness, low back pain with altered mental status. She had been evaluated several months for anemia. She had been seen by Dr. 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 Her macrocytosis was attributed to reticulocytosis suggesting appropriate bone marrow response to her GI blood losses she had been seen by GI and underwent. EGD as well as colonoscopy and was supposed to have a capsule enteroscopy by Dr. Vanda Qiu but apparently it was not done yet. .  CT scan of abdomen and pelvis done on admission showed bibasilar pulmonary interstitial scarring. Liver and pancreas appeared unremarkable. Distended with fecal debris. There was no acute abdominal or pelvic pathology. Mild hydro-ureter and bilateral hydronephrosis was described with a bladder distention. Her CMP shows prerenal azotemia with a BUN of 43 with a serum creatinine of 1.0 and her elevated BUN could be related to GI bleeding. Her hepatic panel shows low albumin of 2.7. Her hemoglobin on admission was low at 6.3 with an MCV of 93. She had moderate neutrophilic leukocytosis with lymphopenia.     Diagnostic Data:     Past Medical History:   Diagnosis Date    Anemia     Hyperlipidemia     Iron deficiency anemia        Patient Active Problem List    Diagnosis Date Noted    Failure to thrive in adult 04/14/2020    Memory deficit 04/14/2020    Anxiety 04/14/2020    Urinary retention 04/14/2020    Anemia 01/17/2020    Hyperlipidemia 01/17/2020    Back pain 01/17/2020    GERD (gastroesophageal reflux disease) 01/17/2020        Past Surgical History:   Procedure Laterality Date    COLONOSCOPY N/A 1/20/2020    COLONOSCOPY DIAGNOSTIC performed by Flor Ross MD at [address] Left 9/29/2019    RADIUS OPEN REDUCTION INTERNAL FIXATION performed by Gladys Heimlich, DO at [facility] ENDOSCOPY N/A 1/19/2020    EGD ESOPHAGOGASTRODUODENOSCOPY performed by Jamshid Chaudhry DO at [facility] OR       Family History  Family History   Problem Relation Age of Onset    Alzheimer's Disease Mother     High Blood Pressure Brother        Social History    TOBACCO:   reports that she has never smoked. She has never used smokeless tobacco.  ETOH:   reports current alcohol use. Home Medications  Prior to Admission medications    Medication Sig Start Date End Date Taking? Authorizing Provider   Multiple Vitamins-Minerals (OCUVITE EYE + MULTI PO) Take 1 tablet by mouth daily    Yes Historical Provider, MD   donepezil (ARICEPT) 5 MG tablet Take 5 mg by mouth Daily with supper   Yes Historical Provider, MD   Cholecalciferol (VITAMIN D3) 50 MCG (2000 UT) CAPS Take 4,000 Units by mouth every morning   Yes Historical Provider, MD   acetaminophen (TYLENOL) 500 MG tablet Take 1,000 mg by mouth 2 times daily as needed    Yes Historical Provider, MD   ferrous sulfate 325 (65 Fe) MG tablet Take 325 mg by mouth 3 times daily (with meals)   Yes Historical Provider, MD   clonazePAM (KLONOPIN) 2 MG tablet Take 1 mg by mouth nightly. 12/29/19  Yes Historical Provider, MD       Allergies  Allergies   Allergen Reactions    Codeine        Review of Systems:      Relevant for generalized fatigue, weakness, intermittent episodes of confusion, poor appetite.   Denies any melena or

## 2020-04-15 NOTE — PROGRESS NOTES
Subjective    Patient seen and examined. Patient is more alert today. Objective:  Vitals:    04/15/20 0937   BP: (!) 114/54   Pulse: 101   Resp: 18   Temp: 99 °F (37.2 °C)   SpO2: 94%      General Appearance:    Alert, cooperative, no distress, appears stated age   Head:    Normocephalic, without obvious abnormality, atraumatic   Eyes:    PERRL, conjunctiva/corneas clear, EOM's intact,    Ears:    Normal TM's and external ear canals, both ears   Nose:   Nares normal, septum midline, mucosa normal, no drainage    or sinus tenderness   Throat:   Lips, mucosa, and tongue normal; teeth and gums normal   Neck:   Supple, symmetrical, trachea midline, no adenopathy;        thyroid:  No enlargement/tenderness/nodules; no carotid    bruit or JVD   Back:     Symmetric, no curvature, ROM normal, no CVA tenderness   Lungs:     Clear to auscultation bilaterally, respirations unlabored   Chest wall:    No tenderness or deformity   Heart:    Regular rate and rhythm, S1 and S2 normal, no murmur, rub   or gallop   Abdomen:     Soft, non-tender, bowel sounds active all four quadrants,     no masses, no organomegaly            Extremities:   Extremities normal, atraumatic, no cyanosis or edema   Pulses:   2+ and symmetric all extremities   Skin:   Skin color, texture, turgor normal, no rashes or lesions   Lymph nodes:   Cervical, supraclavicular, and axillary nodes normal   Neurologic:   CNII-XII intact.  Normal strength, sensation and reflexes       throughout             potassium chloride  20 mEq Oral BID WC    magnesium - glycerin - water   Rectal Once    sodium chloride flush  10 mL Intravenous 2 times per day    sodium chloride  20 mL Intravenous Once    vitamin D  2,000 Units Oral QAM    clonazePAM  1 mg Oral Nightly    donepezil  5 mg Oral Dinner    ferrous sulfate  325 mg Oral TID WC    therapeutic multivitamin-minerals  1 tablet Oral Daily    pantoprazole  40 mg Intravenous BID    And    sodium chloride  Memory deficit    Anxiety    Urinary retention     Continue current care

## 2020-04-16 NOTE — PROGRESS NOTES
Historical Provider, MD       Allergies  Allergies   Allergen Reactions    Codeine        Review of Systems:      Relevant for generalized fatigue, weakness, intermittent episodes of confusion, poor appetite. Denies any melena or hematochezia. Objective  /68   Pulse 118   Temp 98.7 °F (37.1 °C) (Oral)   Resp 17   Ht 5' 4\" (1.626 m)   Wt 105 lb (47.6 kg)   SpO2 94%   BMI 18.02 kg/m²     Physical Exam:     General: Alert and oriented, in no acute distress,   Head and neck : PERRLA, EOMI . Sclera non icteric. Oropharynx : Clear  Neck: no JVD,  no adenopathy,   Heart: Regular rate and regular rhythm,  Lungs: Clear to auscultation   Extremities: No edema,no cyanosis,  Abdomen: Soft, non-tender;no masses, no organomegaly  Skin:  No rash. Neurologic:Cranial nerves grossly intact. No focal motor or sensory deficits .     Recent Laboratory Data-   Lab Results   Component Value Date    WBC 8.4 04/16/2020    HGB 7.8 (L) 04/16/2020    HCT 24.3 (L) 04/16/2020    MCV 90.4 04/16/2020     04/16/2020    LYMPHOPCT 9.0 (L) 04/14/2020    RBC 2.49 (L) 04/16/2020    MCH 29.7 04/16/2020    MCHC 32.9 04/16/2020    RDW 17.5 (H) 04/16/2020    NEUTOPHILPCT 82.0 (H) 04/14/2020    MONOPCT 3.0 04/14/2020    BASOPCT 0.0 04/14/2020    NEUTROABS 11.22 (H) 04/14/2020    LYMPHSABS 1.16 (L) 04/14/2020    MONOSABS 0.39 04/14/2020    EOSABS 0.13 04/14/2020    BASOSABS 0.00 04/14/2020       Lab Results   Component Value Date     04/15/2020    K 3.1 (L) 04/15/2020     04/15/2020    CO2 25 04/15/2020    BUN 19 04/15/2020    CREATININE 0.7 04/15/2020    GLUCOSE 120 (H) 04/15/2020    CALCIUM 8.2 (L) 04/15/2020    PROT 6.9 04/14/2020    LABALBU 2.7 (L) 04/14/2020    BILITOT 1.0 04/14/2020    ALKPHOS 150 (H) 04/14/2020    AST 21 04/14/2020    ALT 21 04/14/2020    LABGLOM >60 04/15/2020    GFRAA >60 04/15/2020       Lab Results   Component Value Date    IRON 27 (L) 04/15/2020    TIBC 151 (L) 04/15/2020    FERRITIN 3,434 04/15/2020           Radiology-    Ct Abdomen Pelvis W Iv Contrast Additional Contrast? None    Result Date: 2020  Patient MRN:  [id number] : 1950 Age: 77 years Gender: Female Order Date:  2020 7:45 AM EXAM: CT ABDOMEN PELVIS W IV CONTRAST Dosage: 599.3 mGY-cm Contrast: 110 mL Isovue-370 INDICATION:  fatigue, abdominal pain fatigue, abdominal pain COMPARISON: None FINDINGS:  There is some right basilar interstitial scarring. There is a small amount of bibasilar pleural thickening. Liver is normal. There is a small amount of sludge in the gallbladder. Pancreas is normal. There is mild caliectasis in both kidneys. Ureters are mildly dilated. Bladder is significantly distended. There is no renal masses. Spleen is normal. There is a moderate amount residual fecal debris in the right side of the colon. Rectum is distended with fecal debris. No abdominal or pelvic lymphadenopathy or fluid collections are noted. Considerable bladder distention and secondary mild bilateral hydronephrosis and hydroureter. No obstructing etiology is noted. Significant amount of residual fecal debris in the right side of the colon as well as the rectum with significant rectal distention     Xr Chest Portable    Result Date: 2020  Patient MRN: [id number] : 1950 Age:  77 years Gender: Female Order Date: 2020 7:45 AM Exam: XR CHEST PORTABLE Number of Images: 1 view Indication:   Acute severe fatigue. Altered mental status Comparison: None. FINDINGS: Heart and pulmonary vascularity normal. Lungs clear. Costophrenic angles sharp. Normal aorta. No acute cardiopulmonary findings. ASSESSMENT/PLAN : 43-year-old woman    1- Severe anemia likely related to intermittent GI blood losses from diverticular bleed versus AVM in the small bowel. She had been seen by GI previously with plans for capsule enteroscopy. Her prior EGD and colonoscopy were negative for active bleeding.   She has required multiple packed RBC transfusion over the past few months. Her iron studies, B12 and folate, reticulocyte count, haptoglobin blood smear will be reviewed. She will be transfused with packed RBCs. 2-Distended urinary bladder with mild bilateral hydronephrosis no CT scan of abdomen and pelvis. To consult urology. - Hgb improved from 6.3 to 7.8 today s/p 1 unit pRBC  B12,Folate, haptoglobin normal    - NM bleeding scan negative  - GI following, S/P  EGD/push enteroscopy yesterday with no fresh bleeding detected .   - Monitor H+H       Kathia Riley MD  Electronically signed 4/16/2020 at 3:19 PM

## 2020-04-16 NOTE — PROGRESS NOTES
[facility] Quality Flow/Interdisciplinary Rounds Progress Note        Quality Flow Rounds held on April 16, 2020    Disciplines Attending:  Bedside Nurse, ,  and Nursing Unit Leadership    Miguel Gonzalez was admitted on 4/14/2020  7:18 AM    Anticipated Discharge Date:  Expected Discharge Date: 04/17/20    Disposition:    Guillermo Score:  Guillermo Scale Score: 17    Readmission Score:         Discussed patient goal for the day, patient clinical progression, and barriers to discharge.   The following Goal(s) of the Day/Commitment(s) have been identified:  Monitor hemoglobin, transfuse at needed, check discharge      [facility]  April 16, 2020

## 2020-04-16 NOTE — PROGRESS NOTES
Physical Therapy  Facility/Department: [address] INTERMEDIATE 1  Daily Treatment Note  NAME: Thaddeus Courtney  : 1950  MRN: [id number]    Date of Service: 2020          Attending Sudha Fernandez MD     Evaluating [address] PT, DPT [id number]     Room #:  1717/9510-P  Diagnosis:  Failure to thrive   Pertinent PMHx/PSHx:  Anemia   Procedure/Surgery:  na  Precautions:  Fall risk, bed alarm, low Hgb , chair alarm   Equipment Needs:  To be determined.      SUBJECTIVE:     Pt lives with  in a 2 story home but stays first floor per chart. Pt unable to state how many steps to enter home.      Pt ambulated with no device per pt PTA.     OBJECTIVE:    Initial Evaluation  Date:  Treatment   Short Term/ Long Term   Goals   Was pt agreeable to Eval/treatment? yes  yes      Does pt have pain? Chronic back pain per pt.   back pain     Bed Mobility  Rolling: mod A   Supine to sit: mod A   Sit to supine: mod a  Scooting: NT  supine to sit modA        SBA   Transfers Sit to stand: - pt refused. Stand to sit: NT  Stand pivot: NT  sit to stand mod  A   Stand to sit mod A  Min A    Ambulation   NT  3-4 steps from bed to bedside chair with w/w mod A 50+ feet with w/w CGA   Stair negotiation: ascended and descended NT   4 steps with 2 rails CGA   AM-PAC 6 Clicks   87/42           Therapeutic Exercises:  NT     Patient education  Pt educated on mobility.     Patient response to education:   Pt verbalized understanding Pt demonstrated skill Pt requires further education in this area       x      ASSESSMENT:     Comments:  Pt found laying in bed agreeable to treatment with encouragement. Pt stands with significantly flexed posture. Pt found to be incontinent of bowel upon standing.   Assisted with hygiene care prior to pivoting to chair.      Treatment:  Patient practiced and was instructed in the following treatment:    -mobility as above     Pt was left sitting in bedside chair  with call

## 2020-04-16 NOTE — PROGRESS NOTES
hours  -Defer transfusion to admitting  -Normal Vit B12 and Folate      2. Constipation/fecal impaction  -CT showing large stool including a large amount in the rectum  - describes overflow diarrhea/incontinence  -Miralax BID  -Attempt 123 enema / Bowel Regimen     3.  Weight loss / anorexia  -20 lb weight loss in 3-4 months  -Colonoscopy unremarkable  -EGD / push enteroscopy per above  -Non-contrast CT abdomen pelvis non-revealing for cause  -XR chest unremarkable  -Consider contrast CT chest / abdomen / pelvis  -Consider appetite stimulants     4. Comorbidities  -Back pain, memory impairment, urinary retention  -Per admitting / consultants     Capsule endoscopy scheduled in office on 4/20/2020. Our service will follow.   Dr. Ailyn Francis, DO  4/16/2020  7:40 AM

## 2020-04-16 NOTE — CONSULTS
 acetaminophen (TYLENOL) tablet 650 mg  650 mg Oral Q6H PRN Fernandez Romero MD        Or    acetaminophen (TYLENOL) suppository 650 mg  650 mg Rectal Q6H PRN Maged I Awadalla, MD        promethazine (PHENERGAN) tablet 12.5 mg  12.5 mg Oral Q6H PRN Maged I Awadalla, MD        Or    ondansetron (ZOFRAN) injection 4 mg  4 mg Intravenous Q6H PRN Fernandez Romero MD        fleet rectal enema 1 enema  1 enema Rectal Once Barrett Samples Masters, APRN - CNP        polyethylene glycol (GLYCOLAX) packet 17 g  17 g Oral BID Barrett Samples Masters, ONEIL - CNP   17 g at 04/16/20 0859    0.9 % sodium chloride bolus  20 mL Intravenous Once Niharika Hernandez MD          sodium chloride 75 mL/hr at 04/16/20 1034       Physical Exam:  /68   Pulse 118   Temp 98.7 °F (37.1 °C) (Oral)   Resp 17   Ht 5' 4\" (1.626 m)   Wt 105 lb (47.6 kg)   SpO2 94%   BMI 18.02 kg/m²   Wt Readings from Last 3 Encounters:   04/16/20 105 lb (47.6 kg)   04/08/20 105 lb (47.6 kg)   03/26/20 105 lb (47.6 kg)     Appearance: Awake, weak-appearing, no acute respiratory distress  Skin: Intact, no rash  Head: Normocephalic, atraumatic  Eyes: EOMI, no conjunctival erythema  ENMT: No pharyngeal erythema, MMM, no rhinorrhea  Neck: Supple, no carotid bruits  Lungs: Decreased BS B/L, no wheezing  Cardiac: Tachycardic, regular rhythm, 2/6 systolic murmur  Abdomen: Soft, nontender, +bowel sounds  Extremities: Moves all extremities x 4, no lower extremity edema  Neurologic: No focal motor deficits apparent, normal mood    Intake/Output:    Intake/Output Summary (Last 24 hours) at 4/16/2020 1521  Last data filed at 4/16/2020 1419  Gross per 24 hour   Intake 2660.92 ml   Output 1850 ml   Net 810.92 ml     No intake/output data recorded.     Laboratory Tests:  Recent Labs     04/14/20  0743 04/15/20  0325    135   K 3.8 3.1*   CL 98 100   CO2 23 25   BUN 43* 19   CREATININE 1.0 0.7   GLUCOSE 222* 120*   CALCIUM 8.6 8.2*     Lab Results   Component

## 2020-04-17 NOTE — PROGRESS NOTES
Age of Onset    Alzheimer's Disease Mother     High Blood Pressure Brother        Social History:  Social History     Socioeconomic History    Marital status:      Spouse name: Not on file    Number of children: Not on file    Years of education: Not on file    Highest education level: Not on file   Occupational History    Not on file   Social Needs    Financial resource strain: Not on file    Food insecurity     Worry: Not on file     Inability: Not on file   [employer] needs     Medical: Not on file     Non-medical: Not on file   Tobacco Use    Smoking status: Never Smoker    Smokeless tobacco: Never Used   Substance and Sexual Activity    Alcohol use: Yes     Comment: social only    Drug use: Never    Sexual activity: Not Currently     Partners: Male   Lifestyle    Physical activity     Days per week: Not on file     Minutes per session: Not on file    Stress: Not on file   Relationships    Social connections     Talks on phone: Not on file     Gets together: Not on file     Attends Rastafarian service: Not on file     Active member of club or organization: Not on file     Attends meetings of clubs or organizations: Not on file     Relationship status: Not on file    Intimate partner violence     Fear of current or ex partner: Not on file     Emotionally abused: Not on file     Physically abused: Not on file     Forced sexual activity: Not on file   Other Topics Concern    Not on file   Social History Narrative    Not on file       Allergies: Allergies   Allergen Reactions    Codeine        Home Medications:  Prior to Admission medications    Medication Sig Start Date End Date Taking?  Authorizing Provider   Multiple Vitamins-Minerals (OCUVITE EYE + MULTI PO) Take 1 tablet by mouth daily    Yes Historical Provider, MD   donepezil (ARICEPT) 5 MG tablet Take 5 mg by mouth Daily with supper   Yes Historical Provider, MD   Cholecalciferol (VITAMIN D3) 50 MCG (2000 UT) CAPS Take 4,000

## 2020-04-17 NOTE — PROGRESS NOTES
Occupational Therapy  OT BEDSIDE TREATMENT NOTE      Date:2020  Patient Name: Thaddeus Courtney  MRN: [id number]  : 1950  Room: [address] [place] [address]        Referring Sandy Lee MD  Evaluating OT: Espinoza Jason. Elizabeth OTR/L - [id number]     AM-PAC Daily Activity Raw Score:   Recommended Adaptive Equipment: Continue to assess.      Diagnosis: Failure to thrive in adult [R62.7]  Pertinent Medical History: anemia     Precautions: falls, bed/chair alarms, skin integrity     Home Living: Patient reported that she lives with her  in a one-floor setup. Bathroom Setup: Patient indicated that she uses a BSC and sponge bathes at home.              Patient is a questionable historian; no family/caregiver present to verify information gathered.     Prior Level of Function (PLOF): Per patient, she needed assistance with most ADLs and needed assistance with all IADLs. Patient indicated that she spends most of her time in bed at home.              Patient is a questionable historian; no family/caregiver present to verify information gathered.     Pain Level: Chronic back pain- Moderate  Cognition: Patient alert and conversing with fair- ability to follow commands.     Functional Assessment:    Initial Eval Status  Date: 4/15/2020 Treatment Status  Date: 20 Short Term Goals  Treatment Frequency/Duration: 2-5x/week for 3-7 days PRN   Feeding Min A   Setup   Grooming Mod A   SBA  (seated/standing at sink)   UB Dressing Mod A   SBA   LB Dressing Max A to adjust socks.   Min A - with use of AE, as needed/appropriate   Bathing Max A   Min A - with use of AE/DME, as needed/appropriate   Toileting Dependent for perineal hygiene while standing following evidence of bowel incontinence. Patient with villafuerte catheter currently.  Max- Dep A Standing Min A   Bed Mobility  Supine-to-Sit: Max A  Sit-to-Supine: Max A  Supine to sit: Mod A  Sit to supine: SBA  Min A   Functional Transfers Sit-to-Stand: Mod A   from EOB and bedside chair  STS: Mod A from EOB SBA   Functional Mobility Min A for few small steps between EOB and bedside chair   (without device) NT due to pain SBA - with use of device, as needed/appropriate   Balance Sitting: Fair  (at EOB)  Standing: Poor+  (without device) Sitting: SBA  Standing: Mod- Max A with forward and L lateral lean demoed  Fair+ dynamic standing balance during completion of ADLs and other functional tasks. Activity Tolerance Poor secondary to back pain. Patient tolerated <5 minutes of sitting in bedside chair; nursing aware of patient's complaints of back pain. Limited by back pain  Patient will demonstrate Good understanding and consistent implementation of energy conservation techniques and work simplification techniques into ADL routines. Comments: Upon arrival pt was supine in bed. At end of session pt was transferred back to bed per request with alarm on, all lines and tubes intact and call light within reach. Treatment: Pt incontinent of bowls upon standing requiring assistance with hygiene. Max- Dep A required during ADLs due to fatigue and minimal participation. Pt declined transferring OOB to chair at end of tx despite vc for encouragement from therapist.    Education: Benefits of position change and facilitating ADLs OOB to improve activity tolerance. · Pt has made fair progress towards set goals.    · Continue with current plan of care      Treatment Charges: Mins Units   Ther Ex  02269     Manual Therapy 89632     Thera Activities 03546     ADL/Home Mgt [id number] 12 1   Neuro Re-ed 01217     Group Therapy      Orthotic manage/training  27552     Non-Billable Time     Total Timed Treatment 12 1       Nancy ZAPATA/ANDREI [id number]

## 2020-04-17 NOTE — PROGRESS NOTES
Name:  Jacek Barksdale  :    MRN:  [id number]  Room:  [facility]7431A  DOS:  2020    [facility]  [facility]  Dr. Lex Vasquez M.D. Dr. Leeta Barthel, M.D. RADHA Colon Dr., M.D. Dr. Татьяна Richardson D.O.    -NP Progress Note-    PCP:  Varun Becerra MD  Admitting Physician:  Varun Becerra MD  Chief Complaint:    Chief Complaint   Patient presents with    Memory Loss     memory loss over the past 6 months, increase in severity past 3 days    Anorexia     not eating for the past 3 days, some vomiting    Diarrhea     x 3 days       Subjective  Patient resting in bed. Confused. Denies complaints. Diarrhea per nursing. Had refused enemas previously.        Physical Examination  Vitals:  BP (!) 114/57   Pulse 106   Temp 98.5 °F (36.9 °C) (Oral)   Resp 16   Ht 5' 4\" (1.626 m)   Wt 104 lb (47.2 kg)   SpO2 95%   BMI 17.85 kg/m²   General Appearance:  awake, confused; appears stated age and cooperative; no apparent distress no labored breathing  HEENT:  PERRL; EOMI; sclera clear; buccal mucosa moist  Neck:  supple; trachea midline; no thyromegaly; no JVD; no bruits  Heart:  rhythm regular; rate controlled; no murmurs  Lungs:  symmetrical; clear to auscultation bilaterally; no wheezes; no rhonchi; no rales  Abdomen:  soft, non-tender, non-distended; bowel sounds positive; no organomegaly or masses; no pain on palpation  Extremities:  peripheral pulses present; no peripheral edema; no ulcers  Neurologic:  alert and oriented x 3; no focal deficit; cranial nerves grossly intact  Skin:  no petechia; no hemorrhage; no wounds    Medications  Scheduled Meds    metoprolol tartrate  37.5 mg Oral BID    famotidine  20 mg Oral Daily    potassium chloride  20 mEq Oral BID WC    magnesium - glycerin - water   Rectal Once    sodium chloride flush  10 mL Intravenous 2 times per day    sodium chloride  20 mL Intravenous Once    vitamin D  2,000 Units Oral QAM   

## 2020-04-17 NOTE — PROGRESS NOTES
[facility]  Hematology/Oncology      Patient Name: Yulissa Renee  YOB: 1950  PCP: Genie Rice MD   Referring Provider:      Reason for Consultation:   Chief Complaint   Patient presents with    Memory Loss     memory loss over the past 6 months, increase in severity past 3 days    Anorexia     not eating for the past 3 days, some vomiting    Diarrhea     x 3 days        Subjective: Confused today, reports diarrhea. Remains weak and fatigued. No active bleeding . Having intermittent atrial fib . History of Present Illness:  70-year-old woman hospitalized to the emergency room with generalized fatigue, weakness, low back pain with altered mental status. She had been evaluated several months for anemia. She had been seen by Dr. Anali Salguero and her work-up was consistent with iron deficiency anemia attributed to diverticular bleed and possibly small bowel AVM. Her macrocytosis was attributed to reticulocytosis suggesting appropriate bone marrow response to her GI blood losses she had been seen by GI and underwent. EGD as well as colonoscopy and was supposed to have a capsule enteroscopy by Dr. Barry Nuno but apparently it was not done yet. .  CT scan of abdomen and pelvis done on admission showed bibasilar pulmonary interstitial scarring. Liver and pancreas appeared unremarkable. Distended with fecal debris. There was no acute abdominal or pelvic pathology. Mild hydro-ureter and bilateral hydronephrosis was described with a bladder distention. Her CMP shows prerenal azotemia with a BUN of 43 with a serum creatinine of 1.0 and her elevated BUN could be related to GI bleeding. Her hepatic panel shows low albumin of 2.7. Her hemoglobin on admission was low at 6.3 with an MCV of 93. She had moderate neutrophilic leukocytosis with lymphopenia.     Diagnostic Data:     Past Medical History:   Diagnosis Date    Anemia     Hyperlipidemia     Iron deficiency anemia Patient Active Problem List    Diagnosis Date Noted    Severe protein-calorie malnutrition ([facility] [address] 04/15/2020    Failure to thrive in adult 04/14/2020    Memory deficit 04/14/2020    Anxiety 04/14/2020    Urinary retention 04/14/2020    Anemia 01/17/2020    Hyperlipidemia 01/17/2020    Back pain 01/17/2020    GERD (gastroesophageal reflux disease) 01/17/2020        Past Surgical History:   Procedure Laterality Date    COLONOSCOPY N/A 1/20/2020    COLONOSCOPY DIAGNOSTIC performed by Josef Wilks MD at [address] Left 9/29/2019    RADIUS OPEN REDUCTION INTERNAL FIXATION performed by Naresh Pal DO at [facility] ENDOSCOPY N/A 1/19/2020    EGD ESOPHAGOGASTRODUODENOSCOPY performed by Chelsea Reardon DO at [facility] N/A 4/15/2020    EGD ESOPHAGOGASTRODUODENOSCOPY performed by Rodo Mcguire DO at [facility] ENDOSCOPY       Family History  Family History   Problem Relation Age of Onset    Alzheimer's Disease Mother     High Blood Pressure Brother        Social History    TOBACCO:   reports that she has never smoked. She has never used smokeless tobacco.  ETOH:   reports current alcohol use. Home Medications  Prior to Admission medications    Medication Sig Start Date End Date Taking?  Authorizing Provider   Multiple Vitamins-Minerals (OCUVITE EYE + MULTI PO) Take 1 tablet by mouth daily    Yes Historical Provider, MD   donepezil (ARICEPT) 5 MG tablet Take 5 mg by mouth Daily with supper   Yes Historical Provider, MD   Cholecalciferol (VITAMIN D3) 50 MCG (2000 UT) CAPS Take 4,000 Units by mouth every morning   Yes Historical Provider, MD   acetaminophen (TYLENOL) 500 MG tablet Take 1,000 mg by mouth 2 times daily as needed    Yes Historical Provider, MD   ferrous sulfate 325 (65 Fe) MG tablet Take 325 mg by mouth 3 times daily (with meals)   Yes Historical Provider, MD   clonazePAM (KLONOPIN) 2 MG tablet Take

## 2020-04-17 NOTE — PROGRESS NOTES
Subjective    Patient seen and examined. Patient had . atrial fibrillation with RVR this AM --> ST after 5 mg IV lopressor    Objective:  Vitals:    04/17/20 1407   BP: 130/60   Pulse: 112   Resp: 16   Temp: 98.4 °F (36.9 °C)   SpO2:       General Appearance:    Alert, cooperative,     Head:    Normocephalic, without obvious abnormality, atraumatic   Eyes:    PERRL, conjunctiva/corneas clear, EOM's intact,           Ears:    Normal TM's and external ear canals, both ears   Nose:   Nares normal, septum midline, mucosa normal, no drainage    or sinus tenderness   Throat:   Lips, mucosa, and tongue normal; teeth and gums normal   Neck:   Supple, symmetrical, trachea midline, no adenopathy;        thyroid:  No enlargement/tenderness/nodules; no carotid    bruit or JVD   Back:     Symmetric, no curvature, ROM normal, no CVA tenderness   Lungs:     Clear to auscultation bilaterally, respirations unlabored   Chest wall:    No tenderness or deformity   Heart:    Regular rate and rhythm, S1 and S2 normal, no murmur, rub   or gallop   Abdomen:     Soft, non-tender, bowel sounds active all four quadrants,     no masses, no organomegaly            Extremities:   Extremities normal, atraumatic, no cyanosis or edema   Pulses:   2+ and symmetric all extremities   Skin:   Skin color, texture, turgor normal, no rashes or lesions   Lymph nodes:   Cervical, supraclavicular, and axillary nodes normal   Neurologic:   CNII-XII intact.  Normal strength, sensation and reflexes       throughout             metoprolol tartrate  37.5 mg Oral BID    famotidine  20 mg Oral Daily    potassium chloride  20 mEq Oral BID WC    sodium chloride flush  10 mL Intravenous 2 times per day    sodium chloride  20 mL Intravenous Once    vitamin D  2,000 Units Oral QAM    clonazePAM  1 mg Oral Nightly    donepezil  5 mg Oral Dinner    ferrous sulfate  325 mg Oral TID WC    therapeutic multivitamin-minerals  1 tablet Oral Daily    polyethylene glycol  17 g Oral BID    sodium chloride  20 mL Intravenous Once           Labs:    Hemoglobin/Hematocrit:    Lab Results   Component Value Date    HGB 8.0 04/17/2020    HCT 25.4 04/17/2020        Imaging:      Assessment and Plan:    Patient Active Problem List   Diagnosis    Anemia    Hyperlipidemia    Back pain    GERD (gastroesophageal reflux disease)    Failure to thrive in adult    Memory deficit    Anxiety    Urinary retention    Severe protein-calorie malnutrition ([facility] [address]     Received  An enema today  On metoprolol 37.5 mg  Bid.

## 2020-04-17 NOTE — PROGRESS NOTES
Physical Therapy  Facility/Department: [address] INTERMEDIATE 1  Daily Treatment Note  NAME: Zoya Adams  : 1950  MRN: [id number]    Date of Service: 2020    Attending Yolonda Dakins, MD     Evaluating [address], [place] [id number]     Room #:  8212/2370-G  Diagnosis:  Failure to thrive   Pertinent PMHx/PSHx:  Anemia   Procedure/Surgery:  na  Precautions:  Fall risk, bed alarm, low Hgb , chair alarm   Equipment Needs:  To be determined.      SUBJECTIVE:     Pt lives with  in a 2 story home but stays first floor per chart. Pt unable to state how many steps to enter home.      Pt ambulated with no device per pt PTA.     OBJECTIVE:    Initial Evaluation  Date:  Treatment   Short Term/ Long Term   Goals   Was pt agreeable to Eval/treatment? yes  yes      Does pt have pain? Chronic back pain per pt.   back pain     Bed Mobility  Rolling: mod A   Supine to sit: mod A   Sit to supine: mod a  Scooting: NT  supine to sit modA  Sit to supine SBA        SBA   Transfers Sit to stand: - pt refused. Stand to sit: NT  Stand pivot: NT  sit to stand mod  A   Stand to sit mod A  Min A    Ambulation   NT  NT due to pain  50+ feet with w/w CGA   Stair negotiation: ascended and descended NT   4 steps with 2 rails CGA   AM-PAC 6 Clicks   52/61           Therapeutic Exercises:  NT     Patient education  Pt educated on mobility.     Patient response to education:   Pt verbalized understanding Pt demonstrated skill Pt requires further education in this area       x      ASSESSMENT:     Comments:  Pt found laying in bed agreeable to treatment. Pt found to be incontinent of bowel upon standing. Assisted with hygiene care and linen change. Pt unable to achieve full standing posture, instead very flexed in standing.   No gait today due to pain.        Treatment:  Patient practiced and was instructed in the following treatment:    -mobility as above     Pt was left laying in bed (per pt

## 2020-04-18 NOTE — PROGRESS NOTES
ferrous sulfate  325 mg Oral TID     therapeutic multivitamin-minerals  1 tablet Oral Daily    polyethylene glycol  17 g Oral BID    sodium chloride  20 mL Intravenous Once     Infusion Meds     PRN Meds clonazePAM, perflutren lipid microspheres, sodium chloride flush, acetaminophen **OR** acetaminophen, promethazine **OR** ondansetron    Laboratory Data  Recent Results (from the past 24 hour(s))   Hemoglobin and hematocrit, blood    Collection Time: 20 11:00 PM   Result Value Ref Range    Hemoglobin 8.3 (L) 11.5 - 15.5 g/dL    Hematocrit 26.2 (L) 34.0 - 48.0 %       Imaging  Nm Gi Blood Loss    Result Date: 4/15/2020  Patient MRN: [id number] : 1950 Age:  77 years Gender: Female Order Date: 2020 3:15 PM Exam: NM GI BLOOD LOSS Indication:   GI bleed GI bleed Comparison: None. Findings: The patient was injected with 2.12 mCi of technetium UltraTag. Physiologic distribution of tracer is noted. No extravasation into the gastrointestinal identified. There is no evidence for acute GI bleed     Ct Abdomen Pelvis W Iv Contrast Additional Contrast? None    Result Date: 2020  Patient MRN:  [id number] : 1950 Age: 77 years Gender: Female Order Date:  2020 7:45 AM EXAM: CT ABDOMEN PELVIS W IV CONTRAST Dosage: 599.3 mGY-cm Contrast: 110 mL Isovue-370 INDICATION:  fatigue, abdominal pain fatigue, abdominal pain COMPARISON: None FINDINGS:  There is some right basilar interstitial scarring. There is a small amount of bibasilar pleural thickening. Liver is normal. There is a small amount of sludge in the gallbladder. Pancreas is normal. There is mild caliectasis in both kidneys. Ureters are mildly dilated. Bladder is significantly distended. There is no renal masses. Spleen is normal. There is a moderate amount residual fecal debris in the right side of the colon. Rectum is distended with fecal debris. No abdominal or pelvic lymphadenopathy or fluid collections are noted.     Considerable bladder distention and secondary mild bilateral hydronephrosis and hydroureter. No obstructing etiology is noted. Significant amount of residual fecal debris in the right side of the colon as well as the rectum with significant rectal distention     Xr Chest Portable    Result Date: 2020  Patient MRN: [id number] : 1950 Age:  77 years Gender: Female Order Date: 2020 7:45 AM Exam: XR CHEST PORTABLE Number of Images: 1 view Indication:   Acute severe fatigue. Altered mental status Comparison: None. FINDINGS: Heart and pulmonary vascularity normal. Lungs clear. Costophrenic angles sharp. Normal aorta. No acute cardiopulmonary findings. Assessment and Plan  1. Anemia  -Initialy hgb 6.3 -> transfused -> 8.2 on 4/15 now drifting back down to 7.4 this am.  -Possible melena per 's report  -BUN normalized  -Colonoscopy in our office within the past 1 to 2 months nonrevealing for source of bleeding  -PPI daily  -Bleeding scan negative  -EGD/push enteroscopy yesterday with no fresh or old blood or source bleeding - discussed with  and he is agreeable  -Monitor hemoglobin every 8 hours  -Defer transfusion to admitting  -Normal Vit B12 and Folate      2.  Constipation/fecal impaction  -CT showing large stool including a large amount in the rectum  - describes overflow diarrhea/incontinence  -Miralax BID  -Enemas / disimpaction per nursing  -Lactulose BID now  -Will start Amitiza on discharge - script in chart     3.  Weight loss / anorexia  -20 lb weight loss in 3-4 months  -Colonoscopy unremarkable  -EGD / push enteroscopy per above  -Non-contrast CT abdomen pelvis non-revealing for cause  -XR chest unremarkable  -Consider contrast CT chest / abdomen / pelvis  -Consider appetite stimulants     4.  Comorbidities  -Back pain, memory impairment, urinary retention  -Per admitting / consultants       Capsule endoscopy scheduled in office on 2020.   Start Amitiza 8mcg BID on

## 2020-04-18 NOTE — PROGRESS NOTES
chloride flush 0.9 % injection 10 mL  10 mL Intravenous PRN Ivet Mary., DO   10 mL at 04/14/20 0920    0.9 % sodium chloride bolus  20 mL Intravenous Once Ivet Mary., DO        vitamin D tablet 2,000 Units  2,000 Units Oral QAM Antony Mora MD   2,000 Units at 04/17/20 0849    donepezil (ARICEPT) tablet 5 mg  5 mg Oral Dinner Fernandez Vieira MD   5 mg at 04/17/20 1619    ferrous sulfate (IRON 325) tablet 325 mg  325 mg Oral TID WC Fernandez Vieira MD   325 mg at 04/17/20 1619    therapeutic multivitamin-minerals 1 tablet  1 tablet Oral Daily Fernandez Vieira MD   1 tablet at 04/17/20 0849    acetaminophen (TYLENOL) tablet 650 mg  650 mg Oral Q6H PRN Antony Mora MD   650 mg at 04/17/20 2036    Or    acetaminophen (TYLENOL) suppository 650 mg  650 mg Rectal Q6H PRN Fernandez Vieira MD        promethazine (PHENERGAN) tablet 12.5 mg  12.5 mg Oral Q6H PRN Maged I Awadalla, MD        Or    ondansetron (ZOFRAN) injection 4 mg  4 mg Intravenous Q6H PRN Maged I Awadalla, MD        polyethylene glycol (GLYCOLAX) packet 17 g  17 g Oral BID Yissel Mustafa, APRN - CNP   17 g at 04/17/20 2037    0.9 % sodium chloride bolus  20 mL Intravenous Once Francisco Delarosa MD             Physical Exam:  /76   Pulse 102   Temp 99 °F (37.2 °C) (Oral)   Resp 20   Ht 5' 4\" (1.626 m)   Wt 120 lb 14.4 oz (54.8 kg)   SpO2 93%   BMI 20.75 kg/m²   Weight change: 16 lb 14.4 oz (7.666 kg)  Wt Readings from Last 3 Encounters:   04/18/20 120 lb 14.4 oz (54.8 kg)   04/08/20 105 lb (47.6 kg)   03/26/20 105 lb (47.6 kg)     The patient is awake, alert although confused and in no discomfort or distress. She appears extremely frail and debilitated. No gross musculoskeletal deformity is present. No significant skin or nail changes are present. Gross examination of head, eyes, nose and throat are negative. Jugular venous pressure is normal and no carotid bruits are present.  Normal respiratory effort additional tachy/bradyarrhythmias overnight  Last Echocardiogram: An echocardiogram demonstrated evidence of a normal-sized left ventricular chamber with normal left ventricular systolic function and stage I diastolic dysfunction with no significant valvular pathology      ASSESSMENT / PLAN: On a clinical basis, the patient presently remains in sinus rhythm with an additional episode of paroxysmal atrial fibrillation the previous evening with most recent adjustment of her beta-blocker dosing within the past 24 hours and in the absence of significant structural abnormalities. Continued careful monitoring of heart rates will be necessary with further modification of her beta-blocker dosing as appropriate based on heart rate and blood pressure. She remains on intravenous fluids with a positive fluid balance and no significant clinical evidence of volume overload with present plan is to discontinue fluids to reduce risk of iatrogenic volume overload. She remains anemic with presently stable hemoglobin levels and presently contraindication to anticoagulation in spite of her embolic risk with a [id number] risk score of a minimum of 2. Additional assessment of her anemia will be deferred to the combination of the hematology and surgical services. Continued careful monitoring of her renal function and electrolytes will be necessary during optimization of management with needs of avoidance of hypokalemia to reduce risk of arrhythmia potential.    Note: This report was completed utilizing computer voice recognition software. Every effort has been made to ensure accuracy, however; inadvertent computerized transcription errors may be present. Tali Pappas.  Celso Guerra, [facility]

## 2020-04-18 NOTE — PROGRESS NOTES
BID    sodium chloride  20 mL Intravenous Once           Labs:    Hemoglobin/Hematocrit:    Lab Results   Component Value Date    HGB 8.3 04/17/2020    HCT 26.2 04/17/2020        Imaging:      Assessment and Plan:    Patient Active Problem List   Diagnosis    Anemia    Hyperlipidemia    Back pain    GERD (gastroesophageal reflux disease)    Failure to thrive in adult    Memory deficit    Anxiety    Urinary retention    Severe protein-calorie malnutrition ([facility] [address]     On lactulose,  To rehab once cleared by consultants

## 2020-04-23 NOTE — DISCHARGE SUMMARY
Physician Discharge Summary     Patient ID:  Jimenez Hassan  [id number]  85 y.o.  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     Consults: GI and hematology/oncology    Significant Diagnostic Studies: labs: daily H/H,   and nuclear medicine: bleeding scan    Treatments: IV hydration and blood transfusion    Discharge Exam:  BP (!) 117/58   Pulse 112   Temp 99 °F (37.2 °C) (Oral)   Resp 20   Ht 5' 4\" (1.626 m)   Wt 120 lb 14.4 oz (54.8 kg)   SpO2 91%   BMI 20.75 kg/m²     General Appearance:    no distress, appears stated age   Head:    Normocephalic, without obvious abnormality, atraumatic   Eyes:    PERRL, conjunctiva/corneas clear, EOM's intact, fundi     benign, both eyes   Ears:    Normal TM's and external ear canals, both ears   Nose:   Nares normal, septum midline, mucosa normal, no drainage    or sinus tenderness   Throat:   Lips, mucosa, and tongue normal; teeth and gums normal   Neck:   Supple, symmetrical, trachea midline, no adenopathy;     thyroid:  no enlargement/tenderness/nodules; no carotid    bruit or JVD   Back:     Symmetric, no curvature, ROM normal, no CVA tenderness   Lungs:     Clear to auscultation bilaterally, respirations unlabored   Chest Wall:    No tenderness or deformity    Heart:    Regular rate and rhythm, S1 and S2 normal, no murmur, rub   or gallop   Breast Exam:    No

## 2020-04-29 PROBLEM — N17.9 AKI (ACUTE KIDNEY INJURY) (HCC): Status: ACTIVE | Noted: 2020-01-01

## 2020-04-29 NOTE — LETTER
Beneficiary Notification Letter  BPCI Advanced     Your Doctor or [address],    We wanted to let you know that your health care provider, [address] [place] [address] Juan Daniel, has volunteered to take part in our [facility] for [facility] & Medicaid Services (CMS) Bundled Payments for [facility] (BPCI Advanced). This doesnt change your Medicare rights or benefits and you dont need to do anything. What are bundled payments? A bundled payment combines, or bundles together, payments that Medicare makes to your health care providers for the many different kinds of medical services you might get in a specific time period. In BPCI Advanced, this time period could include a hospital inpatient stay or outpatient procedure, plus 90 days. Why would Medicare bundle payments? Bundled payments are thought of as a value-based way to pay because health care providers are responsible for both the quality and cost of medical care they give. This is a relatively new way of paying health care providers compared to thefee-for-service way Medicare has traditionally paid, where providers are paid separately for each service they provide. Bundled payments encourage these providers to work together to provide better, more coordinated care during your hospital stay, or outpatient procedure, and through your recovery. What does BPCI Advance mean for me? Youre more likely to get even better care when hospitals, doctors, and other health care providers work together. In BPCI Advanced, hospitals, doctors, and other health care providers may be rewarded for providing better, more coordinated health care. Medicare will watch BPCI Advanced participants closely to make sure that you and other patients keep getting efficient, high quality care. What do I need to know about BPCI Advanced? Whats most important for you to know is that your Medicare rights and benefits wont change because your health care provider is participating in [address] [place]. Medicare will keep covering all of your medically necessary services. Even though Medicare will pay your doctor in a different way under BPCI Advanced, how much you have to pay wont change. Health care providers and suppliers who are enrolled in Medicare will submit their Medicare claims like they always have. Youll have all the same Medicare rights and protections, including the right to choose which hospital, doctor, or other health care provider you see. If you dont want to get care from a health care provider whos participating in [address] [place], then youll have to choose a different health care provider whos not participating in the Model. How can I give feedback about my health care? Medicare might ask you to take a voluntary survey about the services and care you received from  [address] during your hospital stay or outpatient procedure and for a specific period of time afterwards. You can decide whether you want to take the voluntary survey, but if you do, itll help Medicare make BPCI Advanced and the care of other Medicare patients better. If you have concerns or complaints about your care, you can:   · Talk to your doctor or health care provider. · Contact your Beneficiary and Family Centered Care Quality Improvement   Organization [facility]). You can get your [employer]s phone number  at  Medicare.gov/contacts or by calling 1-800-MEDICARE. TTY users can call  9-253.846.1916. Where can I learn more about BPCI Advanced? Learn more about BPCI Advanced at https://innovation.cms.gov/initiatives/bpci-advanced/:  · A list of all the hospitals and physician group practices in the country participating in [address] [place]. · All of the inpatient and outpatient Clinical Episodes that are currently included under BPCI Advanced. A Clinical Episode is a grouping of medical conditions or diagnoses that are included in the [id number] [facility].

## 2020-04-29 NOTE — ED PROVIDER NOTES
Whit Norton is a 72 y.o. female with a PMHx significant for anemia, failure to thrive, HTN, HLD, GERD,  who presents for evaluation of fall and fatgiue, beginning prior to arrival.  The complaint has been persistent, severe in severity, and worsened by nothing. The patient has history of cognitive dysfunction and is alert to person. Spoke with nursing home who states that the patient started to act a bit strange around 1500. They noticed her blood pressure was running a little low. The nurse states later she was called over to the patient as the patient had fallen. She was found sitting on her butt without LOC. Patient at that time was hypotensive, hypoxic, and was very pale. Patient does have history of anemia in the past.         The history is provided by the patient. Review of Systems   Unable to perform ROS: Dementia        Physical Exam  Vitals signs and nursing note reviewed. Constitutional:       General: She is not in acute distress. Appearance: Normal appearance. She is well-developed. She is not ill-appearing. HENT:      Head: Normocephalic and atraumatic. Right Ear: External ear normal.      Left Ear: External ear normal.      Mouth/Throat:      Pharynx: No oropharyngeal exudate or posterior oropharyngeal erythema. Eyes:      Extraocular Movements: Extraocular movements intact. Conjunctiva/sclera: Conjunctivae normal.   Neck:      Musculoskeletal: Normal range of motion and neck supple. Cardiovascular:      Rate and Rhythm: Normal rate and regular rhythm. Heart sounds: Normal heart sounds. No murmur. Pulmonary:      Effort: Pulmonary effort is normal. No respiratory distress. Breath sounds: Normal breath sounds. No stridor. No wheezing. Abdominal:      General: There is no distension. Palpations: Abdomen is soft. There is no mass. Tenderness: There is generalized abdominal tenderness. Musculoskeletal: Normal range of motion.          General: chaperone in the room. Guaiac negative      [BB]      ED Course User Index  [BB] Mario Alberto Bass DO       --------------------------------------------- PAST HISTORY ---------------------------------------------  Past Medical History:  has a past medical history of Altered mental status, Anemia, Chronic pain syndrome, Dementia without behavioral disturbance (HCC), Failure to thrive (0-17), Generalized anxiety disorder, GERD (gastroesophageal reflux disease), Hyperlipidemia, Hypertension, Iron deficiency anemia, Muscle weakness, Muscle weakness, and Vitamin D deficiency. Past Surgical History:  has a past surgical history that includes Forearm surgery (Left, 9/29/2019); Upper gastrointestinal endoscopy (N/A, 1/19/2020); Colonoscopy (N/A, 1/20/2020); and Upper gastrointestinal endoscopy (N/A, 4/15/2020). Social History:  reports that she has never smoked. She has never used smokeless tobacco. She reports previous alcohol use. She reports that she does not use drugs. Family History: family history includes Alzheimer's Disease in her mother; High Blood Pressure in her brother. The patients home medications have been reviewed.     Allergies: Codeine    -------------------------------------------------- RESULTS -------------------------------------------------    Lab  Results for orders placed or performed during the hospital encounter of 04/29/20   Culture, Urine   Result Value Ref Range    Urine Culture, Routine (A)      Growth present, evaluating for:  Mixed gram negative rods  Gram positive organism      Organism Gram negative daily (A)     Urine Culture, Routine       >100,000 CFU/ml  Identification and sensitivity to follow      Organism Gram positive cocci (A)     Urine Culture, Routine       >100,000 CFU/ml  Identification and sensitivity to follow     Culture, Blood 1   Result Value Ref Range    Blood Culture, Routine (A)      Gram stain performed from blood culture bottle media  Gram positive reflex to microscopic   Result Value Ref Range    Color, UA Yellow Straw/Yellow    Clarity, UA SL CLOUDY Clear    Glucose, Ur Negative Negative mg/dL    Bilirubin Urine Negative Negative    Ketones, Urine Negative Negative mg/dL    Specific Gravity, UA 1.010 1.005 - 1.030    Blood, Urine MODERATE (A) Negative    pH, UA 5.5 5.0 - 9.0    Protein, UA TRACE Negative mg/dL    Urobilinogen, Urine 0.2 <2.0 E.U./dL    Nitrite, Urine Negative Negative    Leukocyte Esterase, Urine MODERATE (A) Negative   Lactate, Sepsis   Result Value Ref Range    Lactic Acid, Sepsis 3.3 (H) 0.5 - 1.9 mmol/L   Lactate, Sepsis   Result Value Ref Range    Lactic Acid, Sepsis 2.9 (H) 0.5 - 1.9 mmol/L   Lipase   Result Value Ref Range    Lipase 36 13 - 60 U/L   COVID-19   Result Value Ref Range    SARS-CoV-2, NAAT Not Detected Not Detected   Platelet Confirmation   Result Value Ref Range    Platelet Confirmation CONFIRMED    Microscopic Urinalysis   Result Value Ref Range    WBC, UA 1-3 0 - 5 /HPF    RBC, UA 0-1 0 - 2 /HPF    Bacteria, UA MANY (A) None Seen /HPF   Basic Metabolic Panel w/ Reflex to MG   Result Value Ref Range    Sodium 150 (H) 132 - 146 mmol/L    Potassium reflex Magnesium 4.9 3.5 - 5.0 mmol/L    Chloride 119 (H) 98 - 107 mmol/L    CO2 18 (L) 22 - 29 mmol/L    Anion Gap 13 7 - 16 mmol/L    Glucose 106 (H) 74 - 99 mg/dL    BUN 63 (H) 8 - 23 mg/dL    CREATININE 2.1 (H) 0.5 - 1.0 mg/dL    GFR Non-African American 23 >=60 mL/min/1.73    GFR African American 28     Calcium 7.4 (L) 8.6 - 10.2 mg/dL   Basic metabolic panel   Result Value Ref Range    Sodium 149 (H) 132 - 146 mmol/L    Potassium 5.0 3.5 - 5.0 mmol/L    Chloride 115 (H) 98 - 107 mmol/L    CO2 22 22 - 29 mmol/L    Anion Gap 12 7 - 16 mmol/L    Glucose 139 (H) 74 - 99 mg/dL    BUN 64 (H) 8 - 23 mg/dL    CREATININE 2.1 (H) 0.5 - 1.0 mg/dL    GFR Non-African American 23 >=60 mL/min/1.73    GFR African American 28     Calcium 8.4 (L) 8.6 - 10.2 mg/dL   CBC auto differential of care and counseling regarding the diagnosis and prognosis. Their questions are answered at this time and they are agreeable with the plan.      --------------------------------- ADDITIONAL PROVIDER NOTES ---------------------------------  Consultations:  Spoke with [facility],  They will admit this patient. This patient's ED course included: a personal history and physicial examination, re-evaluation prior to disposition, multiple bedside re-evaluations, IV medications, cardiac monitoring, continuous pulse oximetry and complex medical decision making and emergency management    This patient has remained hemodynamically stable and been closely monitored during their ED course. Please note that the withdrawal or failure to initiate urgent interventions for this patient would likely result in a life threatening deterioration or permanent disability. Accordingly this patient received 31 minutes of critical care time, excluding separately billable procedures. Clinical Impression  1. Anemia, unspecified type    2. Fall, initial encounter    3. BERT (acute kidney injury) ([facility] [address]    4. Hyperkalemia          Disposition  Patient's disposition: Admit to telemetry  Patient's condition is stable.        Rip East DO  Resident  05/01/20 [id number]

## 2020-04-29 NOTE — LETTER
Whats most important for you to know is that your Medicare rights and benefits wont change because your health care provider is participating in [address] [place]. Medicare will keep covering all of your medically necessary services. Even though Medicare will pay your doctor in a different way under [employer], how much you have to pay wont change. Health care providers and suppliers who are enrolled in Medicare will submit their Medicare claims like they always have. Youll have all the same Medicare rights and protections, including the right to choose which hospital, doctor, or other health care provider you see. If you dont want to get care from a health care provider whos participating in [address] [place], then youll have to choose a different health care provider whos not participating in the Model. How can I give feedback about my health care? Medicare might ask you to take a voluntary survey about the services and care you received from [address] [place] [address] during your hospital stay or outpatient procedure and for a specific period of time afterwards. You can decide whether you want to take the voluntary survey, but if you do, itll help Medicare make [employer] [facility] and the care of other Medicare patients better. If you have concerns or complaints about your care, you can:   · Talk to your doctor or health care provider. · Contact your Beneficiary and Family Centered Care Quality Improvement   Organization [facility]). You can get your [employer]s phone number  at  Medicare.gov/contacts or by calling 1-800-MEDICARE. TTY users can call  3-956.374.1926. Where can I learn more about [employer]? Learn more about [employer] at https://innovation.cms.gov/initiatives/bpci-advanced/:  · A list of all the hospitals and physician group practices in the country participating in [address] [place].

## 2020-04-30 PROBLEM — E87.70 VOLUME OVERLOAD: Status: ACTIVE | Noted: 2020-01-01

## 2020-04-30 PROBLEM — I50.31 ACUTE DIASTOLIC CHF (CONGESTIVE HEART FAILURE) (HCC): Status: ACTIVE | Noted: 2020-01-01

## 2020-04-30 PROBLEM — D69.6 THROMBOCYTOPENIA (HCC): Status: ACTIVE | Noted: 2020-01-01

## 2020-04-30 PROBLEM — S32.010A CLOSED COMPRESSION FRACTURE OF THORACOLUMBAR VERTEBRA (HCC): Status: ACTIVE | Noted: 2020-01-01

## 2020-04-30 PROBLEM — E87.5 HYPERKALEMIA: Status: ACTIVE | Noted: 2020-01-01

## 2020-04-30 PROBLEM — S22.080A CLOSED COMPRESSION FRACTURE OF THORACOLUMBAR VERTEBRA (HCC): Status: ACTIVE | Noted: 2020-01-01

## 2020-04-30 PROBLEM — F03.90 DEMENTIA (HCC): Status: ACTIVE | Noted: 2020-01-01

## 2020-04-30 PROBLEM — N39.0 UTI (URINARY TRACT INFECTION): Status: ACTIVE | Noted: 2020-01-01

## 2020-04-30 PROBLEM — J96.01 ACUTE RESPIRATORY FAILURE WITH HYPOXIA (HCC): Status: ACTIVE | Noted: 2020-01-01

## 2020-04-30 PROBLEM — A41.9 SEPSIS (HCC): Status: ACTIVE | Noted: 2020-01-01

## 2020-04-30 PROBLEM — N20.0 KIDNEY STONES: Status: ACTIVE | Noted: 2020-01-01

## 2020-04-30 PROBLEM — E87.29 INCREASED ANION GAP METABOLIC ACIDOSIS: Status: ACTIVE | Noted: 2020-01-01

## 2020-04-30 PROBLEM — E87.20 LACTIC ACIDOSIS: Status: ACTIVE | Noted: 2020-01-01

## 2020-04-30 PROBLEM — E87.0 HYPERNATREMIA: Status: ACTIVE | Noted: 2020-01-01

## 2020-04-30 PROBLEM — I95.9 HYPOTENSION: Status: ACTIVE | Noted: 2020-01-01

## 2020-04-30 NOTE — CONSULTS
, hearing is ok ,no nasal drainage   Respiratory: no sob ,no cough ,no wheezing . Cardiovascular: no chest pain , no palpitation ,no sob . Gastrointestinal: no nausea, vomiting , constipation , no abdominal pain . Genitourinary:no urinary retention , no burning , dysuria . No polyuria   Hematologic/lymphatic: no bleeding , no cougulation issues . Musculoskeletal:no joint pain , no swelling . Neurological: no headaches ,no weakness , no numbness . Endocrine: no thirst , no weight issues . Physical exam:   Vital signs BP (!) 116/56   Pulse 64   Temp 97.3 °F (36.3 °C) (Temporal)   Resp 16   Ht 5' 4\" (1.626 m)   Wt 103 lb 11.2 oz (47 kg)   SpO2 98%   BMI 17.80 kg/m²   Gen : NAD. Head : at , nc   Neck : supple. Eyes : EOMI .  CV : RRR. No edema   Lungs:good flow heard b/l   Abd : soft , NT , BS + , No Organomegaly appreciated . Skin : soft, dry . Neuro :no focal neurologic deficit . Psych : cooperative .      Data:   Labs:  CBC with Differential:    Lab Results   Component Value Date    WBC 7.4 04/30/2020    RBC 2.00 04/30/2020    HGB 8.1 04/30/2020    HCT 24.7 04/30/2020    PLT 52 04/30/2020    MCV 97.0 04/30/2020    MCH 31.0 04/30/2020    MCHC 32.0 04/30/2020    RDW 17.8 04/30/2020    NRBC 0.9 04/30/2020    METASPCT 3.5 04/29/2020    LYMPHOPCT 17.7 04/30/2020    MONOPCT 6.2 04/30/2020    MYELOPCT 1.7 04/29/2020    BASOPCT 0.3 04/30/2020    MONOSABS 0.44 04/30/2020    LYMPHSABS 1.33 04/30/2020    EOSABS 0.00 04/30/2020    BASOSABS 0.00 04/30/2020     CMP:    Lab Results   Component Value Date     04/30/2020    K 3.2 04/30/2020    K 4.9 04/29/2020     04/30/2020    CO2 25 04/30/2020    BUN 40 04/30/2020    CREATININE 1.3 04/30/2020    GFRAA 49 04/30/2020    LABGLOM 41 04/30/2020    GLUCOSE 156 04/30/2020    GLUCOSE 91 06/07/2011    PROT 6.5 04/29/2020    LABALBU 2.4 04/29/2020    LABALBU 4.5 06/07/2011    CALCIUM 7.9 04/30/2020    BILITOT 0.9 04/29/2020    ALKPHOS 252 04/29/2020 AST 12 04/29/2020    ALT 16 04/29/2020     Ionized Calcium:  No results found for: IONCA  Magnesium:    Lab Results   Component Value Date    MG 2.2 04/30/2020     Phosphorus:  No results found for: PHOS  U/A:    Lab Results   Component Value Date    COLORU Yellow 04/29/2020    PHUR 5.5 04/29/2020    WBCUA 1-3 04/29/2020    RBCUA 0-1 04/29/2020    BACTERIA MANY 04/29/2020    CLARITYU SL CLOUDY 04/29/2020    SPECGRAV 1.010 04/29/2020    LEUKOCYTESUR MODERATE 04/29/2020    UROBILINOGEN 0.2 04/29/2020    BILIRUBINUR Negative 04/29/2020    BLOODU MODERATE 04/29/2020    GLUCOSEU Negative 04/29/2020     Microalbumen/Creatinine ratio:  No components found for: RUCREAT  Iron Saturation:  No components found for: PERCENTFE  TIBC:    Lab Results   Component Value Date    TIBC 151 04/15/2020     FERRITIN:    Lab Results   Component Value Date    FERRITIN 3,434 04/15/2020        Imaging:  US DUP LOWER EXTREMITIES BILATERAL VENOUS   Final Result   PATENT DEEP VENOUS SYSTEM OF THE BILATERAL LOWER   EXTREMITY. NO EVIDENCE FOR DVT. CT CHEST WO CONTRAST   Final Result      1. Tiny bilateral effusions and mild bibasilar atelectasis suggesting   mild cardiac decompensation. 2. Band of atelectasis in the superior segment right lower lobe. 3. Slight interval worsening of severe T11 compression fracture and   stable T10 compression fracture. CT Head WO Contrast   Final Result   No indication for an acute intracranial process. CT ABDOMEN PELVIS WO CONTRAST Additional Contrast? None   Final Result      1. Nephrolithiasis on the right. 2. Multiple spinal compression fractures which were not present on 17 January 2020 but are stable since 14 April 2020.   3. Anemia. Cardiomegaly. XR CHEST PORTABLE   Final Result   Pulmonary vascular congestion suggesting elevated left heart filling   pressures.             XR CHEST 1 VW    (Results Pending)       Assessment    -Acute kidney injury prerenal due to poor po intake   -Hyperkalemia   -Hypernateremia due to volume depletion   -SIRS   -Hypotension   -acute/chronic anaemia   -severe deconditioning     Plan :    [facility] BERT felt to be prerenal due to poor po intake , acute anaemia  along with possible obstructive uropathy component (was villafuerte placed in ER or she came with villafuerte )  She had hyper K on admission (resolved) which is felt to be due to BERT along with her being on K supplementation .      Recs :  -continue gentle iv fluids   -she is clinicaly euvolemic with no signs of HF .   -PT/OT  -anaemia evaluation       Thank you Dr. Abbey Avila MD for allowing us to participate in care of Yue Burrows           Electronically signed by Antonia Cabello MD on 4/30/2020 at 7:46 PM

## 2020-04-30 NOTE — PROGRESS NOTES
Physical Therapy    PT orders received and appreciated. Pt chart reviewed to attempt eval. Pt pending neurosurgery consult at this time and reported to have multiple compression fractures and worsening fractures of L3-4 from previous imaging. PT will follow and attempt again at later time/date as able. Thank you.     Karolina Flores, PT, DPT  [id number]

## 2020-04-30 NOTE — CONSULTS
Palliative Care Department  Palliative Care Initial Consult  Provider: Cory Perez Day: 2    Referring Provider:  Symone Hall MD     Reason for Consult:  [x]  Code status Discussion  [x]  Assist with goals of care  []  Psychosocial support  []  Symptom Management  []  Advanced Care Planning    Chief Complaint: Eden Tejada is a 72 y.o. female with chief complaint of fall. Active Hospital Problems    Diagnosis Date Noted    Hypernatremia [E87.0] 04/30/2020    Increased anion gap metabolic acidosis [H75.5] 04/30/2020    Hyperkalemia [E87.5] 04/30/2020    Hypotension [I95.9] 04/30/2020    Thrombocytopenia (HCC) [D69.6] 04/30/2020    Sepsis ([address] [A41.9] 04/30/2020    UTI (urinary tract infection) [N39.0] 04/30/2020    Lactic acidosis [E87.2] 04/30/2020    Closed compression fracture of thoracolumbar vertebra ([address] [S22.080A, S32.010A] 04/30/2020    Kidney stones [N20.0] 04/30/2020    Acute respiratory failure with hypoxia (HCC) [J96.01] 04/30/2020    Volume overload [E87.70] 04/30/2020    Dementia ([address] [F03.90] 04/30/2020    Acute diastolic CHF (congestive heart failure) ([address] [I50.31] 04/30/2020    BERT (acute kidney injury) ([address] [N17.9] 04/29/2020    Paroxysmal atrial fibrillation ([address] [I48.0]     Severe protein-calorie malnutrition ([address] [E43] 04/15/2020    Failure to thrive in adult [R62.7] 04/14/2020    Anxiety [F41.9] 04/14/2020    Anemia requiring transfusions [D64.9] 01/17/2020           Palliative Care Encounter/Recommendations:      - Goals of care: continue current management     - Code Status: full code    Subjective:     HPI:  Eden Tejada is a 72 y.o. female with significant past medical history of atrial fibrillation, dementia, iron deficiency anemia, hyperlipidemia, thoracic spine fracture S/P L5 kyphoplasty, hyperlipidemia, failure to thrive, anxiety, GI bleed who presented with fall from a nursing home.   Patiently recently

## 2020-04-30 NOTE — H&P
Hospital Medicine History & Physical      PCP: Arlyn Menendez MD    Date of Admission: 4/29/2020    Date of Service: Pt seen/examined on 4/30/2020 and Admitted to Inpatient with expected LOS greater than two midnights due to medical therapy. Chief Complaint: Altered mental status, fall and weakness      History Of Present Illness:      72 y.o. female who presented to [facility] from nursing facility with past medical history of atrial fibrillation, dementia, severe protein energy malnutrition, iron deficiency anemia, hyperlipidemia, thoracic spine fracture status post L5 kyphoplasty, hyperlipidemia, failure to thrive, anxiety, GI bleed due to AVM/diverticular disease, decreased functional capacity and chronic pain. Patient was just discharged from the hospital to [facility] facility, she was in [facility] from 4/14-4/18/20, she was admitted for anemia hemoglobin of 6.3 that was transfused, she was also found to be a new onset atrial fibrillation at the time and was discharged on metoprolol, she had bladder outlet obstruction for which she had a Clark placed which is still in situ. Colonoscopy was nondiagnostic, capsule endoscopy and bleeding scan have been scheduled but not yet done. Patient was found down today at the nursing facility. She was sitting on the floor at the time she was found. Circumstances surrounding the fall is unclear. According to ER report, patient was on isolation there for unclear reason. At the moment, patient is unable to answer any questions due to altered mental status. Altered mental status is moderate in intensity, constant, observed as mostly lethargy. No associated fever. Baseline mental status is unclear. Medical records have been reviewed and summarized. Vital signs at the nursing facility noted saturation in the 80s. Current vital signs respiratory 22, pulse 103, blood pressure 92/50.   Labs showed urinalysis with 1-3 white cells, 37.5 mg by mouth 2 times daily 4/18/20   Shayy Qureshi MD   potassium chloride (KLOR-CON M) 20 MEQ extended release tablet Take 1 tablet by mouth 2 times daily (with meals) 4/18/20   Shayy Qureshi MD   famotidine (PEPCID) 20 MG tablet Take 1 tablet by mouth daily 4/19/20   Shayy Qureshi MD   Multiple Vitamins-Minerals (OCUVITE EYE + MULTI PO) Take 1 tablet by mouth daily     Historical Provider, MD   donepezil (ARICEPT) 5 MG tablet Take 5 mg by mouth Daily with supper    Historical Provider, MD   Cholecalciferol (VITAMIN D3) 50 MCG (2000 UT) CAPS Take 4,000 Units by mouth every morning    Historical Provider, MD   acetaminophen (TYLENOL) 500 MG tablet Take 1,000 mg by mouth 2 times daily as needed     Historical Provider, MD   ferrous sulfate 325 (65 Fe) MG tablet Take 325 mg by mouth 3 times daily (with meals)    Historical Provider, MD   clonazePAM (KLONOPIN) 2 MG tablet Take 1 mg by mouth nightly. 12/29/19   Historical Provider, MD       Allergies:  Codeine    Social History:      The patient currently lives at [facility]:   reports that she has never smoked. She has never used smokeless tobacco.  ETOH:   reports current alcohol use. Family History:     Reviewed in detail Positive as follows:        Problem Relation Age of Onset    Alzheimer's Disease Mother     High Blood Pressure Brother        REVIEW OF SYSTEMS:   Pertinent positives as noted in the HPI. Unable to obtain due to altered mental status    PHYSICAL EXAM:    BP (!) 113/58   Pulse 77   Temp 96.5 °F (35.8 °C) (Oral)   Resp 18   Ht 5' 4\" (1.626 m)   Wt 114 lb 4.8 oz (51.8 kg)   SpO2 96%   BMI 19.62 kg/m²     General appearance: Elderly female, chronically ill-appearing and weak, pale, no apparent distress, appears stated age and cooperative. Afebrile. HEENT:  Normal cephalic, atraumatic without obvious deformity. Pupils equal, round, and reactive to light. Extra ocular muscles intact.  Conjunctivae/corneas clear.  Dry oral cavity. Neck: Supple, with full range of motion. No jugular venous distention. Trachea midline. Respiratory:  Normal respiratory effort. Diminished air movement otherwise clear to auscultation, bilaterally without Rales/Wheezes/Rhonchi. Cardiovascular:  Regular rate and rhythm with normal S1/S2 without murmurs, rubs or gallops. Abdomen: Soft, non-tender, non-distended with normal bowel sounds. Musculoskeletal:  No clubbing/cyanosis, 2+ edema bilaterally. Limited range of motion without deformity. Skin: Skin color is pale, texture, turgor normal.  No rashes or lesions. Neurologic:   Cranial nerves: II-XII intact, grossly non-focal.  Psychiatric:  Alert and oriented to self by name, thought content appropriate, impaired insight  Capillary Refill: Brisk,< 3 seconds   Peripheral Pulses: +palpable, equal bilaterally       Labs:     Recent Labs     04/29/20 1852   WBC 13.9*   HGB 7.3*   HCT 23.0*   PLT 72*     Recent Labs     04/29/20 1852 04/29/20  2235   * 150*   K 6.5* 4.9   * 119*   CO2 16* 18*   BUN 76* 63*   CREATININE 3.1* 2.1*   CALCIUM 8.4* 7.4*     Recent Labs     04/29/20 1852   AST 12   ALT 16   BILITOT 0.9   ALKPHOS 252*     No results for input(s): INR in the last 72 hours. Recent Labs     04/29/20 1852   TROPONINI <0.01       Urinalysis:      Lab Results   Component Value Date    NITRU Negative 04/29/2020    WBCUA 1-3 04/29/2020    BACTERIA MANY 04/29/2020    RBCUA 0-1 04/29/2020    BLOODU MODERATE 04/29/2020    SPECGRAV 1.010 04/29/2020    GLUCOSEU Negative 04/29/2020       Radiology:   Reviewed and documented    CT Head WO Contrast   Final Result   No indication for an acute intracranial process. CT ABDOMEN PELVIS WO CONTRAST Additional Contrast? None   Final Result      1. Nephrolithiasis on the right. 2. Multiple spinal compression fractures which were not present on 17 January 2020 but are stable since 14 April 2020.   3. Anemia. Cardiomegaly. off antibiotics for now unless she spikes a temp or elevated procal.  Check procalcitonin. #5. Hypotension likely secondary to dehydration. Give IV fluids. Check orthostatics and monitor vitals closely. Hold blood pressure medications. #6.  Acute diastolic heart failure, does have signs of pulmonary edema. Last echo recently done EF was 60%. Currently getting fluid due to multiple problems above. Diurese as needed. Monitor volume status. #7.  Lactic/high anion gap metabolic acidosis, give fluid and recheck. #8.  Acute thrombocytopenia, may be due to infection. Avoid anticoagulation at this time. Monitor platelet count. She does have some hematuria. #9.  Multiple compression fractures of thoracolumbar spine likely worsened after she fell today. Neurosurgery consulted. I do not think she is a good surgical candidate, may need MRI, defer further imaging to neurosurgery. #10. Multiple nonobstructing kidney stones. #11. Hyperkalemia, received treatment in the ER, potassium level has normalized. Hold potassium supplementation. #12. Abnormal UA/possible UTI, urine culture. Patient has had Clark since 2 weeks ago. She received a dose of Rocephin in the ER, hold further antibiotics for now unless procalcitonin is high or if she spikes a temp. #13. Altered mental status secondary to multiple problems above, she does have underlying dementia, unknown baseline. CT scan of the head is negative. #14. Anemia requiring transfusion, current hemoglobin 7.3, she recently got blood, hemoglobin at discharge was 7.8. She has some hematuria. Continue oral iron, transfuse as needed if less than 7. #15. History of GI bleed  #16. Newly diagnosed paroxysmal atrial fibrillation currently rate controlled and sinus, not anticoagulated. #17. Dementia with psychotic features, continue home meds. #18. Severe malnutrition with failure to thrive, dietitian consult. #19.   Hypertension, blood pressure is running low, hold medications. .#20. Leg edema, rule out DVT       DVT Prophylaxis: SCDs if Doppler studies are negative. NO anticoagulation for now  Diet: No diet orders on file renal diet if she is able to swallow safely  Code Status: Prior full code, palliative care consult to address goals of care. PT/OT Eval Status: As needed    Dispo -inpatient/telemetry. Sierra Clarke MD    Thank you Brunilda Hernandez MD for the opportunity to be involved in this patient's care.

## 2020-05-01 NOTE — PROGRESS NOTES
Onset    Alzheimer's Disease Mother     High Blood Pressure Brother         reports that she has never smoked. She has never used smokeless tobacco. She reports previous alcohol use. She reports that she does not use drugs. Review of Systems:   Constitutional: weak  Eyes: no eye pain , no itching , no drainage  Ears, nose, mouth, throat, and face: no ear ,nose pain , hearing is ok ,no nasal drainage   Respiratory: no sob ,no cough ,no wheezing . Cardiovascular: no chest pain , no palpitation ,no sob . Gastrointestinal: no nausea, vomiting , constipation , no abdominal pain . Genitourinary:no urinary retention , no burning , dysuria . No polyuria   Hematologic/lymphatic: no bleeding , no cougulation issues . Musculoskeletal:no joint pain , no swelling . Neurological: no headaches ,no weakness , no numbness . Endocrine: no thirst , no weight issues .      MEDS (scheduled):    donepezil  5 mg Oral Dinner    ferrous sulfate  325 mg Oral TID WC    lubiprostone  8 mcg Oral BID    metoprolol tartrate  37.5 mg Oral BID    cefTRIAXone (ROCEPHIN) IV  1 g Intravenous Q24H    potassium chloride  40 mEq Oral BID WC    sodium chloride flush  10 mL Intravenous 2 times per day       MEDS (infusions):   dextrose         MEDS (prn):  acetaminophen, LORazepam, promethazine **OR** ondansetron, sodium chloride flush, glucose, dextrose, glucagon (rDNA), dextrose    PHYSICAL EXAM:     Patient Vitals for the past 24 hrs:   BP Temp Temp src Pulse Resp SpO2 Weight   05/01/20 1445 -- 97.8 °F (36.6 °C) Temporal -- 18 -- --   05/01/20 0800 (!) 157/70 98 °F (36.7 °C) Oral 68 16 -- --   05/01/20 0245 (!) 127/56 96.9 °F (36.1 °C) Axillary 61 16 -- --   05/01/20 0044 -- -- -- -- -- -- 113 lb (51.3 kg)   04/30/20 2041 129/60 96.2 °F (35.7 °C) Axillary 54 16 97 % --   @      Intake/Output Summary (Last 24 hours) at 5/1/2020 1600  Last data filed at 5/1/2020 1452  Gross per 24 hour   Intake 658 ml   Output 2150 ml   Net -1492 Cuca Choi MD on 5/1/2020 at 4:00 PM

## 2020-05-01 NOTE — PROGRESS NOTES
Chart reviewed. Goals of care and code status have been identified. No further needs from palliative medicine standpoint. Will sign off. Please re-consult if needed. Thank you!

## 2020-05-01 NOTE — PROGRESS NOTES
 dextrose       Scheduled Medications    donepezil  5 mg Oral Dinner    ferrous sulfate  325 mg Oral TID     lubiprostone  8 mcg Oral BID    metoprolol tartrate  37.5 mg Oral BID    cefTRIAXone (ROCEPHIN) IV  1 g Intravenous Q24H    potassium chloride  40 mEq Oral BID     sodium chloride flush  10 mL Intravenous 2 times per day     PRN Meds: acetaminophen, LORazepam, promethazine **OR** ondansetron, sodium chloride flush, glucose, dextrose, glucagon (rDNA), dextrose      Intake/Output Summary (Last 24 hours) at 5/1/2020 1611  Last data filed at 5/1/2020 1452  Gross per 24 hour   Intake 658 ml   Output 2150 ml   Net -1492 ml       Exam:    /62   Pulse 90   Temp 97.8 °F (36.6 °C) (Temporal)   Resp 18   Ht 5' 4\" (1.626 m)   Wt 113 lb (51.3 kg)   SpO2 97%   BMI 19.40 kg/m²     General appearance: No apparent distress, appears stated age and cooperative. HEENT: Pupils equal, round, and reactive to light. Conjunctivae/corneas clear. Neck: Supple, with full range of motion. No jugular venous distention. Trachea midline. Respiratory:  Normal respiratory effort. Clear to auscultation, bilaterally without Rales/Wheezes/Rhonchi. Cardiovascular: Regular rate and rhythm with normal S1/S2 without murmurs, rubs or gallops. Abdomen: Soft, non-tender, non-distended with normal bowel sounds. Musculoskeletal: No clubbing, cyanosis or edema bilaterally. Full range of motion without deformity. Skin: Skin color, texture, turgor normal.  No rashes or lesions.     Capillary Refill: Brisk,< 3 seconds   Peripheral Pulses: +2 palpable, equal bilaterally       Labs:   Recent Labs     04/29/20  1852 04/30/20  0204 04/30/20  1506   WBC 13.9* 7.4  --    HGB 7.3* 6.2* 8.1*   HCT 23.0* 19.4* 24.7*   PLT 72* 52*  --      Recent Labs     04/30/20  1506 04/30/20  1754 04/30/20  2321    144 142   K 3.3* 3.2* 3.6   * 109* 104   CO2 20* 25 24   BUN 44* 40* 35*   CREATININE 1.3* 1.3* 1.1*   CALCIUM 7.7* 7.9*

## 2020-05-02 NOTE — PLAN OF CARE
Problem: Falls - Risk of:  Goal: Will remain free from falls  Description: Will remain free from falls  Outcome: Met This Shift  Goal: Absence of physical injury  Description: Absence of physical injury  Outcome: Met This Shift     Problem: Injury - Risk of, Physical Injury:  Goal: Will remain free from falls  Description: Will remain free from falls  Outcome: Met This Shift  Goal: Absence of physical injury  Description: Absence of physical injury  Outcome: Met This Shift

## 2020-05-02 NOTE — DISCHARGE INSTR - COC
Continuity of Care Form    Patient Name: Miguel Gonzalez   :  1950  MRN:  [id number]    Admit date:  2020  Discharge date:  2020    Code Status Order: Full Code   Advance Directives:   Advance Care Flowsheet Documentation     Date/Time Healthcare Directive Type of Healthcare Directive Copy in [address] Agent's Name Healthcare Agent's Phone Number    20 0153  No, patient does not have an advance directive for healthcare treatment per [facility] patient does not have any advanced directives on file --  Other (Comment) per [facility] patient does not have any advanced directives on file -- -- --          Admitting Physician:  Leticia Cabrera MD  PCP: Lorraine Mayo MD    Discharging Nurse: Juan Pablo Betancur. Unit/Room#: [address] Unit Phone Number: [phone]    Emergency Contact:   Extended Emergency Contact Information  Primary Emergency Contact: Ursulajin Quinones  Address: [address] [place] [address] Phone: [phone]  Work Phone: [phone]  Relation: Spouse  Secondary Emergency Contact: Percy Radha  Mobile Phone: [phone]  Relation: Brother/Sister    Past Surgical History:  Past Surgical History:   Procedure Laterality Date    COLONOSCOPY N/A 2020    COLONOSCOPY DIAGNOSTIC performed by Coty Denis MD at [address] Left 2019    RADIUS OPEN REDUCTION INTERNAL FIXATION performed by Edgar Jackson DO at [address] N/A 2020    EGD ESOPHAGOGASTRODUODENOSCOPY performed by Neha Parker DO at [address] N/A 4/15/2020    EGD ESOPHAGOGASTRODUODENOSCOPY performed by Austin Ricketts DO at [facility] ENDOSCOPY       Immunization History:   Immunization History   Administered Date(s) Administered    Influenza Virus Vaccine 10/16/2019    Influenza, High Dose · Name:   · Address:  · Phone:  · Fax:    Dialysis Facility (if applicable)   · Name:  · Address:  · Dialysis Schedule:  · Phone:  · Fax:    / signature: {Esignature:536439612}    PHYSICIAN SECTION    Prognosis: {Prognosis:3702112597}    Condition at Discharge: [place] [employer] Patient Condition:130894491}    Rehab Potential (if transferring to Rehab): {Prognosis:2440933135}    Recommended Labs or Other Treatments After Discharge: ***    Physician Certification: I certify the above information and transfer of [place]  is necessary for the continuing treatment of the diagnosis listed and that she requires {Admit to Appropriate Level of Care:75940} for {GREATER/LESS:723823991} 30 days.      Update Admission H&P: {CHP DME Changes in BVAPN:861869317}    PHYSICIAN SIGNATURE:  Electronically signed by Aida Puckett DO on 5/4/20 at 1:10 PM EDT

## 2020-05-02 NOTE — PROGRESS NOTES
Nutritional intake very poor even after multiple attempts of encouragement. Dietitian consulted for poor PO intake. Suggest calorie count.

## 2020-05-03 NOTE — CONSULTS
GENERAL SURGERY  CONSULT NOTE  5/3/2020    Physician Consulted: Dr. Dwayne Moss covering for Dr. Vladimir Platt  Reason for Consult: Colonoscopy evaluation for Clostridium perfringens bacteremia  Referring Physician: Dr. Tania Robison    [facility] Terrell is a 72 y.o. female who presents for evaluation of colonoscopy evaluation for Clostridium perfringens bacteremia. Patient was admitted after being found down and altered at her nursing facility. During work up blood cultures were obtained growing clostridium perfringens, general surgery is consulted regarding possible scopes to investigate GI source of sepsis. Patient had aborted c scope 1/20/2020. She then went to Dr. Carrie Perez where she had a completed colonoscopy in February or March that did not show any explanation for her chronic anemia. She also had an EGD and push endoscopy 4/15 that was normal, and had a pill endoscopy completed 4/20 for where there are no records. Patient denies any abdominal pain, nausea, vomiting. She denies diarrhea she is unsure when her last BM was.        Past Medical History:   Diagnosis Date    Altered mental status     Anemia     Chronic pain syndrome     Dementia without behavioral disturbance (HCC)     Failure to thrive (0-17)     Generalized anxiety disorder     GERD (gastroesophageal reflux disease)     Hyperlipidemia     Hypertension     Iron deficiency anemia     Muscle weakness     Muscle weakness     Vitamin D deficiency        Past Surgical History:   Procedure Laterality Date    COLONOSCOPY N/A 1/20/2020    COLONOSCOPY DIAGNOSTIC performed by Tatianna Foreman MD at [facility] Left 9/29/2019    RADIUS OPEN REDUCTION INTERNAL FIXATION performed by Hilario English DO at [address] N/A 1/19/2020    EGD ESOPHAGOGASTRODUODENOSCOPY performed by Jacob Davila DO at [address] N/A 4/15/2020    EGD negative  Cardiovascular ROS: negative  Gastrointestinal ROS: negative  Genito-Urinary ROS: negative  Musculoskeletal ROS: negative      PHYSICAL EXAM:    Vitals:    20 1511   BP: (!) 140/64   Pulse: 78   Resp: 18   Temp: 97.5 °F (36.4 °C)   SpO2: 96%       General Appearance:  awake, alert, in no acute distress  Lungs:  No increased work of breathing  Heart:  Heart regular rate and rhythm  Abdomen:  Soft, NT, ND. No rebound, guarding, rigidity  Extremities: pulses present in all extremities      LABS:    CBC  Recent Labs     20  044   WBC 3.8*   HGB 8.0*   HCT 25.1*   PLT 40*     BMP  Recent Labs     20      K 3.7      CO2 25   BUN 10   CREATININE 0.7   CALCIUM 7.7*     Liver Function  Recent Labs     20   BILITOT 0.9   AST 14   ALT 13   ALKPHOS 115*   PROT 5.6*   LABALBU 2.2*     No results for input(s): LACTATE in the last 72 hours. No results for input(s): INR, PTT in the last 72 hours. Invalid input(s): PT    RADIOLOGY    Ct Abdomen Pelvis Wo Contrast Additional Contrast? None    Result Date: 2020  Patient MRN:  [id number] : 1950 Age: 72 years Gender: Female Order Date:  2020 8:00 PM EXAM: CT ABDOMEN PELVIS WO CONTRAST NUMBER OF IMAGES \ views:  18 INDICATION:  abdominal pain/distension COMPARISON: 2020. 2020 TECHNIQUE: Axial computerized tomography sections of the abdomen and pelvis with sagittal and coronal MPR reconstructions were obtained from the top of the diaphragm to the pelvis. Low-dose CT  acquisition technique included one of following options; 1 . Automated exposure control, 2. Adjustment of MA and or KV according to patient's size or 3. Use of iterative reconstruction. FINDINGS: THORACIC BASE: There is bibasilar atelectasis. There is a minimal right pleural effusion. The blood pool is significantly hyperlucent compared to the interventricular septum indicating underlying anemia.  Heart is enlarged LIVER: Unremarkable. BILIARY: The gallbladder and bile ducts are unremarkable. PANCREAS: Unremarkable. SPLEEN: Unremarkable. ADRENALS:  Unremarkable. KIDNEYS:  Tiny nonobstructing stone on the right. GI: No evidence of free air or bowel obstruction. The appendix is not visualized. PELVIS: Unremarkable. MSK: There are compression fractures which are of indeterminate age at T10, T11, L3 and L4. There is a kyphoplasty at L5. Please note the findings at T11, L3 and L4 clearly represent interval progression compared to a more remote study of 2020. OTHER: None. 1. Nephrolithiasis on the right. 2. Multiple spinal compression fractures which were not present on 2020 but are stable since 2020. 3. Anemia. Cardiomegaly. Ct Head Wo Contrast    Result Date: 2020  Patient MRN:  [id number] : 1950 Age: 72 years Gender: Female Order Date:  2020 8:00 PM TECHNIQUE/NUMBER OF IMAGES/COMPARISON/CLINICAL HISTORY: CT brain Axial images were obtained sagittal and coronal reconstructions 144 images Clinical history fall trauma injury. 27-year-old female patient. No previous studies available for comparison. FINDINGS: There are unremarkable appearance for peripheral CSF space and ventricular system considering the age group although some relative prominence of the CSF space over the cerebral convexities are observed the. There is no focal mass effect or midline shift. There is no evidence for a sizable area of an acute or recent insult in progression to the brain parenchyma. There is no indication for an acute intracranial hemorrhagic event. The Images with bone window settings demonstrate no significant findings. No indication for an acute intracranial process.     Ct Chest Wo Contrast    Result Date: 2020  Patient MRN:  [id number] : 1950 Age: 72 years Gender: Female Order Date:  2020 5:30 AM EXAM: CT CHEST WO CONTRAST INDICATION:  PNA PNA COMPARISON: Portable chest x-ray 2020 at 1822 hours. Portable chest 2020. TECHNIQUE: Axial images from the lung apices to the diaphragm without IV contrast. Axial, and coronal 2-D reconstructions in soft tissue windows. Axial and sagittal 2-D reconstructions in the lung windows. Dose reduction techniques were used. Contrast is none. Dose is total .29 MG Y CM. FINDINGS:  There is a mild band of atelectasis crossing the superior segment right lower lobe. Trace posterior right basilar atelectasis. Minimal posterior left lower lobe atelectasis. 3 tiny bilateral pleural effusions. No pneumothorax or obvious emphysematous changes. Mild cardiomegaly. Moderate CAD. Mild calcification of the aortic arch without significant dilatation. No hiatal hernia. Esophagus is not dilated. Normal thyroid. No adenopathy or lung nodules. ABDOMEN: The visualized upper abdomen is grossly negative. Bones: A severe compression fracture deformity of T11 with central gas formation. A mild retropulsion and a mild central stenosis. No evidence for an associated disc herniation. Mild compression fracture involving the inferior endplate of G27 with 41% loss of height and a slight kyphosis at T10/T11. Compared with CT abdomen 2020, the T11 fracture shows slightly worse compression and increasing amount of central gas. 1. Tiny bilateral effusions and mild bibasilar atelectasis suggesting mild cardiac decompensation. 2. Band of atelectasis in the superior segment right lower lobe. 3. Slight interval worsening of severe T11 compression fracture and stable T10 compression fracture. Xr Chest Portable    Result Date: 2020  Patient MRN: [id number] : 1950 Age:  72 years Gender: Female Order Date: 2020 6:30 PM Exam: XR CHEST PORTABLE Number of Images: 1 view Indication:   hypoxic Comparison: 2020 FINDINGS: The pulmonary vascularity now appears congested suggesting elevated left heart filling pressures.  The heart size is in the upper

## 2020-05-03 NOTE — PROGRESS NOTES
Nutrition Assessment    Type and Reason for Visit: Initial, Consult    Nutrition Recommendations: Noted consult for poor PO intake. Would recommend liberalizing diet to General Diet order d/t patient w/ resolved hyperkalemia & noted pt receiving oral Potassium supplements. Renal Diet is not indicated at this time and allow patient wider range of options may increase PO intake. WIll add Boost Pudding TID to promote additional PO intake at this time. Will continue to montior& follow patient closely and provide recommendations as needed. Nutrition Assessment: Noted pt w/ severe malnutrition criteria. Continues w/ poor PO intake since admit. Recommend liberalization of diet w/ no current Hyperkalemia & pt recieving oral K supplements. Will add Boost Pudding TID to provide additional protein source for patient. Malnutrition Assessment:  · Malnutrition Status: Meets the criteria for severe malnutrition  · Context: Acute illness or injury  · Findings of the 6 clinical characteristics of malnutrition (Minimum of 2 out of 6 clinical characteristics is required to make the diagnosis of moderate or severe Protein Calorie Malnutrition based on AND/ASPEN Guidelines):  1. Energy Intake-Less than or equal to 50% of estimated energy requirement, Greater than or equal to 3 months(per EMR review )    2. Weight Loss-10% loss or greater, (x 4 month )  3. Fat Loss-Unable to assess(d/t PPE shortages ),    4. Muscle Loss-Unable to assess,    5. Fluid Accumulation-Mild fluid accumulation, Extremities  6.   Strength-Not measured    Nutrition Risk Level: High    Nutrient Needs:  · Estimated Daily Total Kcal: 0676-5394(MSJ: 1020 x 1.3 )  · Estimated Daily Protein (g): 65-75(1.3-1.5)  · Estimated Daily Total Fluid (ml/day): 1300ml     Nutrition Diagnosis:   · Problem: Severe malnutrition, In context of chronic illness  · Etiology: related to Cognitive or neurological impairment(hx of dementia )     Signs and symptoms:

## 2020-05-04 NOTE — PROGRESS NOTES
DO Cyrus        dextrose 5 % solution  100 mL/hr Intravenous PRN Daniel June DO             REVIEW OF SYSTEMS:      CONSTITUTIONAL: Denies fever    HEENT: denies blurring of vision or double vision, denies hearing problem  RESPIRATORY: denies cough, shortness of breath, sputum expectoration, chest pain. CARDIOVASCULAR:  Denies palpitation  GASTROINTESTINAL:  Denies abdomen pain, diarrhea or constipation. GENITOURINARY: Clark catheter   INTEGUMENT: denies wound , rash  HEMATOLOGIC/LYMPHATIC:  Denies lymph node swelling, gum bleeding or easy bruising. MUSCULOSKELETAL:  Denies leg pain , joint pain , joint swelling  NEUROLOGICAL:  Lethargy     PHYSICAL EXAM:      Vitals:     /78   Pulse 80   Temp 97.8 °F (36.6 °C) (Temporal)   Resp 20   Ht 5' 4\" (1.626 m)   Wt 111 lb 4.8 oz (50.5 kg)   SpO2 100%   BMI 19.10 kg/m²     General Appearance:    Awake, alert ,confused    Head:    Normocephalic, atraumatic   Eyes:    No pallor, no icterus,   Ears:    No obvious deformity or drainage.    Nose:   No nasal drainage   Throat:   Mucosa moist, no oral thrush   Neck:   Supple, no lymphadenopathy   Back:     no CVA tenderness   Lungs:     Clear to auscultation bilaterally, no wheeze    Heart:    Regular rate and rhythm, no murmur   Abdomen:     Soft, non-tender, bowel sounds present    Extremities:   No edema, no cyanosis    Pulses:   Dorsalis pedis palpable    Skin:   no rashes or lesions       CBC with Differential:      Lab Results   Component Value Date    WBC 3.8 05/04/2020    RBC 2.76 05/04/2020    HGB 8.1 05/04/2020    HCT 25.1 05/04/2020    PLT 45 05/04/2020    MCV 90.9 05/04/2020    MCH 29.3 05/04/2020    MCHC 32.3 05/04/2020    RDW 15.4 05/04/2020    NRBC 0.9 04/30/2020    METASPCT 1.7 05/04/2020    LYMPHOPCT 23.5 05/04/2020    MONOPCT 2.6 05/04/2020    MYELOPCT 0.9 05/04/2020    BASOPCT 0.8 05/04/2020    MONOSABS 0.11 05/04/2020    LYMPHSABS 0.91 05/04/2020    EOSABS 0.30 05/04/2020    BASOSABS 0.00 piperacillin-tazobactam Sensitive <=^4 mcg/mL     tobramycin Intermediate ^8 mcg/mL     trimethoprim-sulfamethoxazole Resistant >=^320 mcg/mL     Citrobacter freundii (2)     Antibiotic Interpretation KEN Status    ceFAZolin Resistant <=^4 mcg/mL     cefepime Sensitive <=^0.12 mcg/mL     cefTRIAXone Sensitive <=^0.25 mcg/mL     ertapenem Sensitive <=^0.12 mcg/mL     gentamicin Resistant >=^16 mcg/mL     levofloxacin Resistant >=^8 mcg/mL     nitrofurantoin Sensitive <=^16 mcg/mL     piperacillin-tazobactam Sensitive ^8 mcg/mL     tobramycin Intermediate ^8 mcg/mL     trimethoprim-sulfamethoxazole Resistant >=^320 mcg/mL     Streptococcus anginosus (3)     Antibiotic Interpretation KEN Status    ampicillin Sensitive <=^0.25 mcg/mL     benzylpenicillin Sensitive <=^0.06 mcg/mL     cefotaxime Sensitive <=^0.12 mcg/mL     cefTRIAXone Sensitive ^0.5 mcg/mL     levofloxacin Sensitive ^1 mcg/mL     vancomycin Sensitive ^0.5 mcg/mL      Condensed View   Lab and Collection         Radiology :    CT abdomen and pelvis -      1. Nephrolithiasis on the right. 2. Multiple spinal compression fractures which were not present on 17 January 2020 but are stable since 14 April 2020.  3. Cardiomegaly. IMPRESSION:     1. Complicated UTI ( citrobacter )   2. Clostridium bacteremia   3. Fever / Leukocytosis sec to above     RECOMMENDATIONS:      1. Invanz 1 gram IV q 24 hrs   2. Midline insertion   3.  Surgery consult appreciated - no colonoscopy planned

## 2020-05-04 NOTE — PROGRESS NOTES
Hospitalist Progress Note      PCP: Jacquelin Dean MD    Date of Admission: 4/29/2020    Chief Complaint    fall    Hospital Course: *Patient was found down today at the nursing facility.  She was sitting on the floor at the time she was found.  Circumstances surrounding the fall is unclear.  According to ER report, patient was on isolation there for unclear reason.  At the moment, patient is unable to answer any questions due to altered mental status.  Altered mental status is moderate in intensity, constantPatient is pancytopenic. patient was admitted and is currently being treated for UTI, patient is also bacteremic with Clostridium. ** Patient was just discharged from the hospital to [facility], she was in [place] from 4/14-4/18/20, she was admitted for anemia hemoglobin of 6.3 that was transfused, she was also found to be a new onset atrial fibrillation at the time and was discharged on metoprolol, she had bladder outlet obstruction for which she had a Clark placed which is still in situ.  Colonoscopy was nondiagnostic, capsule endoscopy and bleeding scan have been scheduled but not yet done.   She is getting a picc/midline and dc back to facility     Subjective: **no complaints      Medications:  Reviewed    Infusion Medications    sodium chloride 50 mL/hr at 05/04/20 1202    dextrose       Scheduled Medications    potassium bicarb-citric acid  40 mEq Oral BID    magnesium sulfate  2 g Intravenous Once    ertapenem (INVANZ) IVPB  1 g Intravenous Daily    donepezil  5 mg Oral Dinner    ferrous sulfate  325 mg Oral TID WC    lubiprostone  8 mcg Oral BID    metoprolol tartrate  37.5 mg Oral BID    sodium chloride flush  10 mL Intravenous 2 times per day     PRN Meds: acetaminophen, LORazepam, promethazine **OR** ondansetron, sodium chloride flush, glucose, dextrose, glucagon (rDNA), dextrose      Intake/Output Summary (Last 24 hours) at 5/4/2020 1521  Last data

## 2020-05-04 NOTE — PROGRESS NOTES
Nurse to nurse given to April, Viet Gurrola at [phone]. All discharge paperwork faxed to [facility] at [phone].

## 2020-05-04 NOTE — CONSULTS
[facility]  Hematology/Oncology  Consult      Patient Name: Yandy Rain  YOB: 1950  PCP: Akhil Bhandari MD   Referring Provider:      Reason for Consultation:   Chief Complaint   Patient presents with   Anahi Strongring     had a fall today, and was found on floor, hyoxic, tachy, and abdominal pain.  Fatigue        History of Present Illness:  79-year-old woman nursing home resident with history of dementia admitted through the emergency room with lethargy fever and altered mental status. She has a chronic indwelling Clark catheter. Apparently her blood culture is growing gram-positive rods consistent with Clostridium perfringens. She has been seen by ID and is on antibiotics with Invanz. She had been seen previously by Dr. Shawn Toro  and her work-up was consistent with iron deficiency anemia attributed to diverticular bleed and possibly small bowel AVM. Her prior macrocytosis was attributed to reticulocytosis suggesting appropriate bone marrow response to her GI blood losses she had been seen by GI and underwent. EGD as well as colonoscopy and was supposed to have a capsule enteroscopy . CT chest on admission showed tiny bilateral effusions with mild bibasilar atelectasis. There was compression fracture at T10 level. CT head was unremarkable. CT scan of abdomen and pelvis showed kyphoplasty changes and right-sided nephrolithiasis but no acute process. Doppler ultrasound studies of lower extremities were negative for DVT. We are consulted for pancytopenia. CBC shows a hemoglobin of 8.1 with an MCV of 90 low platelets at 45 with WBC count of 3.8  WBC differential shows adequate ANC of 2.5 with moderate lymphopenia with absolute lymphocyte count of 0.9. Hepatic panel shows hyperproteinemia with low albumin of 2.4. Creatinine was normal with adequate GFR.   Diagnostic Data:     Past Medical History:   Diagnosis Date    Altered mental status     Anemia     Chronic pain syndrome     Dementia without behavioral disturbance (HCC)     Failure to thrive (0-17)     Generalized anxiety disorder     GERD (gastroesophageal reflux disease)     Hyperlipidemia     Hypertension     Iron deficiency anemia     Muscle weakness     Muscle weakness     Vitamin D deficiency        Patient Active Problem List    Diagnosis Date Noted    Hypernatremia 04/30/2020    Increased anion gap metabolic acidosis 11/91/3323    Hyperkalemia 04/30/2020    Hypotension 04/30/2020    Thrombocytopenia ([address] 04/30/2020    Sepsis ([address] 04/30/2020    UTI (urinary tract infection) 04/30/2020    Lactic acidosis 04/30/2020    Closed compression fracture of thoracolumbar vertebra ([address] 04/30/2020    Kidney stones 04/30/2020    Acute respiratory failure with hypoxia ([address] 04/30/2020    Volume overload 04/30/2020    Dementia ([address] 04/30/2020    Acute diastolic CHF (congestive heart failure) ([address] 04/30/2020    BERT (acute kidney injury) ([address] 04/29/2020    Paroxysmal atrial fibrillation (HCC)     Cognitive dysfunction     Hypokalemia     Severe protein-calorie malnutrition ([address] 04/15/2020    Failure to thrive in adult 04/14/2020    Memory deficit 04/14/2020    Anxiety 04/14/2020    Urinary retention 04/14/2020    Anemia requiring transfusions 01/17/2020    Hyperlipidemia 01/17/2020    Back pain 01/17/2020    GERD (gastroesophageal reflux disease) 01/17/2020        Past Surgical History:   Procedure Laterality Date    COLONOSCOPY N/A 1/20/2020    COLONOSCOPY DIAGNOSTIC performed by Padmaja Palma MD at [address] Left 9/29/2019    RADIUS OPEN REDUCTION INTERNAL FIXATION performed by Elmira Terrazas DO at [address] N/A 1/19/2020    EGD ESOPHAGOGASTRODUODENOSCOPY performed by Oswald Pierre DO at [address] [place] N/A 4/15/2020    EGD ESOPHAGOGASTRODUODENOSCOPY performed by To Bill DO Ricardo at [facility] ENDOSCOPY       Family History  Family History   Problem Relation Age of Onset    Alzheimer's Disease Mother     High Blood Pressure Brother        Social History    TOBACCO:   reports that she has never smoked. She has never used smokeless tobacco.  ETOH:   reports previous alcohol use. Home Medications  Prior to Admission medications    Medication Sig Start Date End Date Taking? Authorizing Provider   magnesium hydroxide (MILK OF MAGNESIA) 400 MG/5ML suspension Take 30 mLs by mouth daily as needed for Constipation    Historical Provider, MD   lubiprostone (AMITIZA) 8 MCG CAPS capsule Take 1 capsule by mouth 2 times daily 4/18/20   Kavitha Hernandez Masters, APRN - CNP   metoprolol tartrate 37.5 MG TABS Take 37.5 mg by mouth 2 times daily 4/18/20   Gordon Woo MD   potassium chloride (KLOR-CON M) 20 MEQ extended release tablet Take 1 tablet by mouth 2 times daily (with meals) 4/18/20   Gordon Woo MD   famotidine (PEPCID) 20 MG tablet Take 1 tablet by mouth daily 4/19/20   Gordon Woo MD   Multiple Vitamins-Minerals (OCUVITE EYE + MULTI PO) Take 1 tablet by mouth daily     Historical Provider, MD   donepezil (ARICEPT) 5 MG tablet Take 5 mg by mouth Daily with supper    Historical Provider, MD   Cholecalciferol (VITAMIN D3) 50 MCG (2000 UT) CAPS Take 4,000 Units by mouth every morning    Historical Provider, MD   acetaminophen (TYLENOL) 500 MG tablet Take 1,000 mg by mouth 2 times daily as needed     Historical Provider, MD   ferrous sulfate 325 (65 Fe) MG tablet Take 325 mg by mouth 3 times daily (with meals)    Historical Provider, MD   clonazePAM (KLONOPIN) 2 MG tablet Take 1 mg by mouth nightly. 12/29/19   Historical Provider, MD       Allergies  Allergies   Allergen Reactions    Codeine        Review of Systems:    Relevant for fatigue, weakness, fever and chills.     Objective  /78   Pulse 80   Temp 97.8 °F (36.6 °C) (Temporal)   Resp 20   Ht 5' 4\" (1.626 m) prominence of the CSF space over the cerebral convexities are observed the. There is no focal mass effect or midline shift. There is no evidence for a sizable area of an acute or recent insult in progression to the brain parenchyma. There is no indication for an acute intracranial hemorrhagic event. The Images with bone window settings demonstrate no significant findings. No indication for an acute intracranial process. Ct Chest Wo Contrast    Result Date: 2020  Patient MRN:  [id number] : 1950 Age: 72 years Gender: Female Order Date:  2020 5:30 AM EXAM: CT CHEST WO CONTRAST INDICATION:  PNA PNA COMPARISON: Portable chest x-ray 2020 at 1822 hours. Portable chest 2020. TECHNIQUE: Axial images from the lung apices to the diaphragm without IV contrast. Axial, and coronal 2-D reconstructions in soft tissue windows. Axial and sagittal 2-D reconstructions in the lung windows. Dose reduction techniques were used. Contrast is none. Dose is total .29 MG Y CM. FINDINGS:  There is a mild band of atelectasis crossing the superior segment right lower lobe. Trace posterior right basilar atelectasis. Minimal posterior left lower lobe atelectasis. 3 tiny bilateral pleural effusions. No pneumothorax or obvious emphysematous changes. Mild cardiomegaly. Moderate CAD. Mild calcification of the aortic arch without significant dilatation. No hiatal hernia. Esophagus is not dilated. Normal thyroid. No adenopathy or lung nodules. ABDOMEN: The visualized upper abdomen is grossly negative. Bones: A severe compression fracture deformity of T11 with central gas formation. A mild retropulsion and a mild central stenosis. No evidence for an associated disc herniation. Mild compression fracture involving the inferior endplate of V24 with 00% loss of height and a slight kyphosis at T10/T11.  Compared with CT abdomen 2020, the T11 fracture shows slightly worse compression and increasing amount of central

## 2020-05-04 NOTE — PROGRESS NOTES
Power midline Placement 5/4/2020    Product number: [id number]   Lot Number: [id number]      Ultrasound: yes   R Basilic:                Upper Arm Circumference: 24cm    Size: 4.5fsl    Exposed Length: 3cm    Internal Length: 12cm   Cut: -   Vein Measurement: 0.5cm  Inserted by angelina Billingsley  5/4/2020  3:24 PM

## 2020-05-04 NOTE — CARE COORDINATION
Blake Looney following, no precert needed when stable for discharge. Ambulance form on soft chart. [facility] for 10 days. Patient needs midline prior to discharge. COVID test with negative result also needs completed prior to discharge. Spouse notified of discharge to Antonia Saab at 1900. Facility notified of discharge time. Alex ambulette arranged.

## 2020-05-06 NOTE — DISCHARGE SUMMARY
Hospital Medicine Discharge Summary    Patient: Chinyere De Leon     Gender: female  : 1950   Age: 72 y.o. MRN: [id number]    Code Status:  Full code    Primary Care Provider: Anel Carrasquillo MD    Admit Date: 2020   Discharge Date: 2020      Admitting Physician: Jose E Demarco MD  Discharge Physician: Amie Estrada DO     Discharge Diagnoses:     Active Hospital Problems    Diagnosis Date Noted    Hypernatremia [E87.0] 2020    Increased anion gap metabolic acidosis [Q58.7] 2020    Hyperkalemia [E87.5] 2020    Hypotension [I95.9] 2020    Thrombocytopenia (HCC) [D69.6] 2020    Sepsis ([address] [A41.9] 2020    UTI (urinary tract infection) [N39.0] 2020    Lactic acidosis [E87.2] 2020    Closed compression fracture of thoracolumbar vertebra ([address] [S22.080A, S32.010A] 2020    Kidney stones [N20.0] 2020    Acute respiratory failure with hypoxia (HCC) [J96.01] 2020    Volume overload [E87.70] 2020    Dementia ([address] [F03.90] 2020    Acute diastolic CHF (congestive heart failure) ([address] [I50.31] 2020    BERT (acute kidney injury) ([address] [N17.9] 2020    Paroxysmal atrial fibrillation (HCC) [I48.0]     Severe protein-calorie malnutrition ([address] [E43] 04/15/2020    Failure to thrive in adult [R62.7] 2020    Anxiety [F41.9] 2020    Anemia requiring transfusions [D64.9] 2020   UTI sepsis probably due to indwelling villafuerte    Hospital Course:   **Patient was found down today at the nursing facility. Evaristo Ramos was sitting on the floor at the time she was found.  Circumstances surrounding the fall is unclear.  According to ER report, patient was on isolation there for unclear reason.  At the moment, patient is unable to answer any questions due to altered mental status.  Altered mental status is moderate in intensity, constantPatient is pancytopenic. patient was admitted and is currently being treated for UTI, patient is also bacteremic with Clostridium. ** Patient was just discharged from the hospital to [facility] facility, she was in [place] from 4/14-4/18/20, she was admitted for anemia hemoglobin of 6.3 that was transfused, she was also found to be a new onset atrial fibrillation at the time and was discharged on metoprolol, she had bladder outlet obstruction for which she had a Clark placed which is still in situ.  Colonoscopy was nondiagnostic, capsule endoscopy and bleeding scan have been scheduled but not yet done. She is getting a picc/midline and dc back to facility *    Disposition:  Long-Term Acute Care    Exam:     /68   Pulse 80   Temp 97.2 °F (36.2 °C) (Temporal)   Resp 20   Ht 5' 4\" (1.626 m)   Wt 110 lb (49.9 kg)   SpO2 100%   BMI 18.88 kg/m²   Gen: *well developed  HEENT: NC/AT, moist mucous membranes, no oropharyngeal erythema or exudate  Neck: supple, trachea midline, no anterior cervical or SC LAD  Heart:  Normal s1/s2, RRR, no murmurs, gallops, or rubs. Lungs:  cta * bilaterally, *  Abd: bowel sounds present, soft, nontender, nondistended, no masses  Extrem:  No clubbing, cyanosis,  *no edema  Skin: no rashes or lesions  Psych: Not oriented to date or Not oriented to place  Neuro: grossly intact, moves all four extremities. Capillary Refill: Brisk,< 3 seconds   Peripheral Pulses: +2 palpable, equal bilaterally  IP CONSULT TO NEPHROLOGY  IP CONSULT TO HOSPITALIST  IP CONSULT TO NEUROSURGERY  IP CONSULT TO PALLIATIVE CARE  IP CONSULT TO INFECTIOUS DISEASES  IP CONSULT TO PHARMACY  IP CONSULT TO DIETITIAN  IP CONSULT TO GENERAL SURGERY  IP CONSULT TO HEM/ONC  IP CONSULT TO IV TEAM    Labs:  For convenience and continuity at follow-up the following most recent labs are provided:    Lab Results   Component Value Date    WBC 3.8 05/04/2020    HGB 8.1 05/04/2020    HCT 25.1 05/04/2020    MCV 90.9 05/04/2020    PLT 45 05/04/2020     05/04/2020    K 3.3 05/04/2020     05/04/2020    CO2 26 05/04/2020    BUN 9 05/04/2020    CREATININE 0.5 05/04/2020    CALCIUM 7.8 05/04/2020    ALKPHOS 113 05/04/2020    ALT 13 05/04/2020    AST 12 05/04/2020    BILITOT 0.8 05/04/2020    LABALBU 2.4 05/04/2020    LABALBU 4.5 06/07/2011    LDLCALC 158 06/07/2011    TRIG 107 06/07/2011     Lab Results   Component Value Date    INR 1.2 04/15/2020    INR 1.0 01/17/2020       Radiology:  XR CHEST 1 VW   Final Result   Minimal atelectasis in lung bases. There is no focal consolidation. US DUP LOWER EXTREMITIES BILATERAL VENOUS   Final Result   PATENT DEEP VENOUS SYSTEM OF THE BILATERAL LOWER   EXTREMITY. NO EVIDENCE FOR DVT. CT CHEST WO CONTRAST   Final Result      1. Tiny bilateral effusions and mild bibasilar atelectasis suggesting   mild cardiac decompensation. 2. Band of atelectasis in the superior segment right lower lobe. 3. Slight interval worsening of severe T11 compression fracture and   stable T10 compression fracture. CT Head WO Contrast   Final Result   No indication for an acute intracranial process. CT ABDOMEN PELVIS WO CONTRAST Additional Contrast? None   Final Result      1. Nephrolithiasis on the right. 2. Multiple spinal compression fractures which were not present on 17 January 2020 but are stable since 14 April 2020.   3. Anemia. Cardiomegaly. XR CHEST PORTABLE   Final Result   Pulmonary vascular congestion suggesting elevated left heart filling   pressures.                 Discharge Medications:   Discharge Medication List as of 5/4/2020  3:54 PM      START taking these medications    Details   ertapenem [facility]) infusion Infuse 1,000 mg intravenously every 24 hours for 10 days For 10 days, Disp-[id number] mg, R-0Print           Discharge Medication List as of 5/4/2020  3:54 PM        Discharge Medication List as of 5/4/2020  3:54 PM      CONTINUE these medications which have NOT CHANGED    Details   magnesium hydroxide (MILK OF MAGNESIA) 400 MG/5ML suspension Take 30 mLs by mouth daily as needed for ConstipationHistorical Med      lubiprostone (AMITIZA) 8 MCG CAPS capsule Take 1 capsule by mouth 2 times daily, Disp-60 capsule, R-3Print      metoprolol tartrate 37.5 MG TABS Take 37.5 mg by mouth 2 times daily, Disp-60 tablet, R-3Normal      famotidine (PEPCID) 20 MG tablet Take 1 tablet by mouth daily, Disp-60 tablet, R-3Normal      Multiple Vitamins-Minerals (OCUVITE EYE + MULTI PO) Take 1 tablet by mouth daily Historical Med      donepezil (ARICEPT) 5 MG tablet Take 5 mg by mouth Daily with supperHistorical Med      Cholecalciferol (VITAMIN D3) 50 MCG (2000 UT) CAPS Take 4,000 Units by mouth every morningHistorical Med      acetaminophen (TYLENOL) 500 MG tablet Take 1,000 mg by mouth 2 times daily as needed Historical Med      ferrous sulfate 325 (65 Fe) MG tablet Take 325 mg by mouth 3 times daily (with meals)Historical Med           Discharge Medication List as of 5/4/2020  3:54 PM      STOP taking these medications       potassium chloride (KLOR-CON M) 20 MEQ extended release tablet Comments:   Reason for Stopping:         clonazePAM (KLONOPIN) 2 MG tablet Comments:   Reason for Stopping: Follow-up appointments:  1 week    Provider Follow-up:    pmd    Condition at Discharge:  Stable    The patient was seen and examined on day of discharge and this discharge summary is in conjunction with any daily progress note from day of discharge. Time Spent on discharge is 45 minutes  in the examination, evaluation, counseling and review of medications and discharge plan. Signed:    CHRISTIANA Byrd   5/6/2020      Thank you Dane Bah MD for the opportunity to be involved in this patient's care.  If you have any questions or concerns please feel free to contact me at [phone]

## 2020-05-27 NOTE — OP NOTE
Operative Note      Patient: Stiven Grant  YOB: 1950  MRN: [id number]    Date of Procedure: 1/19/2020    Pre-Op Diagnosis: [place], GI bleed    Post-Op Diagnosis: Same       Procedure(s):  Esophagogastroduodenoscopy with biopsy    Surgeon: Rosmery Blankenship  Assistant:   * No surgical staff found *    Anesthesia: Monitor Anesthesia Care    Estimated Blood Loss (mL): Minimal    Complications: None    Specimens:   * No specimens in log *    Implants:  * No implants in log *      Drains:   [REMOVED] Urethral Catheter Straight-tip; Non-latex 16 fr (Removed)       [REMOVED] Urethral Catheter (Removed)       Findings: Mild antral gastritis. No evidence of GI bleeding found on upper endoscopy. Detailed Description of Procedure:   Procedures form endoscopy sooner conscious lesion from the anesthesia staff. Esophogastric placed oropharynx advanced in the esophageal difficulty. Esophageal mucosa was grossly normal throughout length. Within the stomach there is mild gastritis in the antrum. First and second parts of duodenum were grossly normal.  Scope was retroflexed back within the stomach. There are no ulcers or any obvious sources of upper GI bleeding seen on endoscopy. Antral biopsies obtained with the forceps. Scope was then removed.     Electronically signed by Veronica Leger DO on 5/27/2020 at 2:27 PM

## 2020-05-30 PROBLEM — N39.0 UTI (URINARY TRACT INFECTION): Status: RESOLVED | Noted: 2020-01-01 | Resolved: 2020-01-01

## 2023-01-16 NOTE — PATIENT INSTRUCTIONS
Discharge Notification    Patient Name: Mateo Kawasaki: Piper Amin #: [id number]  Authorization Number: [id number]  Admit Date/Time: 1/13/2023 1:46 PM  Discharge Date/Time: 1/15/2023 2:07 PM Progressively increase weightbearing to the left hand/wrist as you tolerate  Daily scar tissue massage to the hand and wrist  Aggressive range of motion   EMG/NCT ordered due to persistent numbness and tingling to the left hand    Follow up 2-3 months
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
## (undated) DEVICE — SET LAMBOTTE

## (undated) DEVICE — BANDAGE COMPR W4INXL10YD WHITE/BEIGE E MTRX HK LOOP CLSR

## (undated) DEVICE — HAND SET

## (undated) DEVICE — DRIP REDUCTION MANIFOLD

## (undated) DEVICE — SOLUTION IV IRRIG POUR BRL 0.9% SODIUM CHL 2F7124

## (undated) DEVICE — Y-TYPE TUR/BLADDER IRRIGATION SET, REGULATING CLAMP

## (undated) DEVICE — BIT DRL L110MM DIA1.8MM QUIK CPL CALIB W/O STP REUSE

## (undated) DEVICE — ZIMMER® STERILE DISPOSABLE TOURNIQUET CUFF WITH PROTECTIVE SLEEVE AND PLC, DUAL PORT, SINGLE BLADDER, 18 IN. (46 CM)

## (undated) DEVICE — Z DISCONTINUED USE 2275686 GLOVE SURG SZ 8 L12IN FNGR THK13MIL WHT ISOLEX POLYISOPRENE

## (undated) DEVICE — DRAPE SURGICAL HAND PROX AURORA

## (undated) DEVICE — DRESSING,GAUZE,XEROFORM,CURAD,5"X9",ST: Brand: CURAD

## (undated) DEVICE — GLOVE ORANGE PI 7 1/2   MSG9075

## (undated) DEVICE — BLOCK BITE 60FR RUBBER ADLT DENTAL

## (undated) DEVICE — BIT DRL L100MM DIA2MM ST QUIK CPL NONRADIOPAQUE W/O STP

## (undated) DEVICE — SET ORTHO STD STORTSTD2

## (undated) DEVICE — GOWN,AURORA,BRTHSLV,2XL,18/CS: Brand: MEDLINE

## (undated) DEVICE — 3M™ STERI-DRAPE™ U-DRAPE 1015: Brand: STERI-DRAPE™

## (undated) DEVICE — BANDAGE,ELASTIC,ESMARK,STERILE,4"X9',LF: Brand: MEDLINE

## (undated) DEVICE — INTENDED FOR TISSUE SEPARATION, AND OTHER PROCEDURES THAT REQUIRE A SHARP SURGICAL BLADE TO PUNCTURE OR CUT.: Brand: BARD-PARKER ® STAINLESS STEEL BLADES

## (undated) DEVICE — Z INACTIVE USE 2660664 SOLUTION IRRIG 3000ML 0.9% SOD CHL USP UROMATIC PLAS CONT

## (undated) DEVICE — GOWN,BREATHABLE SLV,AURORA,XLG,STRL: Brand: MEDLINE

## (undated) DEVICE — TUBING SUCT 12FR MAL ALUM SHFT FN CAP VENT UNIV CONN W/ OBT

## (undated) DEVICE — GLOVE ORANGE PI 7   MSG9070

## (undated) DEVICE — PATIENT RETURN ELECTRODE, SINGLE-USE, CONTACT QUALITY MONITORING, ADULT, WITH 9FT CORD, FOR PATIENTS WEIGING OVER 33LBS. (15KG): Brand: MEGADYNE

## (undated) DEVICE — CHLORAPREP 26ML ORANGE

## (undated) DEVICE — SCREW BNE L14MM DIA2.4MM DST RAD VOLAR S STL ST VAR ANG LOK
Type: IMPLANTABLE DEVICE | Site: RADIUS | Status: NON-FUNCTIONAL
Removed: 2019-09-29

## (undated) DEVICE — DRILL SYSTEM 7

## (undated) DEVICE — GLOVE ORANGE PI 8   MSG9080

## (undated) DEVICE — GRADUATE TRIANG MEASURE 1000ML BLK PRNT

## (undated) DEVICE — SET ORTHO STD STORTSTD1

## (undated) DEVICE — CLOTH SURG PREP PREOPERATIVE CHLORHEXIDINE GLUC 2% READYPREP

## (undated) DEVICE — STANDARD HYPODERMIC NEEDLE,ALUMINUM HUB: Brand: MONOJECT

## (undated) DEVICE — TOWEL,OR,DSP,ST,BLUE,DLX,10/PK,8PK/CS: Brand: MEDLINE

## (undated) DEVICE — PADDING,UNDERCAST,COTTON, 4"X4YD STERILE: Brand: MEDLINE

## (undated) DEVICE — PADDING,UNDERCAST,COTTON, 3X4YD STERILE: Brand: MEDLINE

## (undated) DEVICE — SPLINT ORTH W4XL15IN PLSTR OF PARIS LO EXOTHERM SMOOTH

## (undated) DEVICE — SOLUTION IV IRRIG WATER 1000ML POUR BRL 2F7114

## (undated) DEVICE — DRAPE C ARM W41XL74IN UNIV MOB W RUBBERBAND CLP

## (undated) DEVICE — 3M™ COBAN™ NL STERILE NON-LATEX SELF-ADHERENT WRAP, 2084S, 4 IN X 5 YD (10 CM X 4,5 M), 18 ROLLS/CASE: Brand: 3M™ COBAN™

## (undated) DEVICE — SURGICAL PROCEDURE PACK BASIC

## (undated) DEVICE — SPONGE GZ W4XL4IN RAYON POLY FILL CVR W/ NONWOVEN FAB